# Patient Record
Sex: MALE | Race: WHITE
[De-identification: names, ages, dates, MRNs, and addresses within clinical notes are randomized per-mention and may not be internally consistent; named-entity substitution may affect disease eponyms.]

---

## 2018-01-04 ENCOUNTER — HOSPITAL ENCOUNTER (OUTPATIENT)
Dept: HOSPITAL 33 - SDC | Age: 68
Discharge: HOME | End: 2018-01-04
Attending: SURGERY
Payer: MEDICARE

## 2018-01-04 VITALS — DIASTOLIC BLOOD PRESSURE: 72 MMHG | HEART RATE: 65 BPM | SYSTOLIC BLOOD PRESSURE: 121 MMHG

## 2018-01-04 VITALS — OXYGEN SATURATION: 95 %

## 2018-01-04 DIAGNOSIS — C43.59: Primary | ICD-10-CM

## 2018-01-04 DIAGNOSIS — L98.9: ICD-10-CM

## 2018-01-04 PROCEDURE — 88305 TISSUE EXAM BY PATHOLOGIST: CPT

## 2018-01-04 PROCEDURE — 0HB6XZZ EXCISION OF BACK SKIN, EXTERNAL APPROACH: ICD-10-PCS | Performed by: SURGERY

## 2018-01-04 NOTE — OP
SURGERY DATE/TIME:    01/04/2018  1150



PREOPERATIVE DIAGNOSIS:     A 2.5 cm nonhealing lesion of back possibly cancer.



POSTOPERATIVE DIAGNOSIS:  A 2.5 cm nonhealing lesion of back possibly cancer.



PROCEDURE:    Excision of 2.5 cm lesion through a 5 x 3 cm elliptical excision with 
closure. 



SURGEON:        Max Tsai M.D.



ASSISTANT:         Medical Student III.



ANESTHESIA:    IVS sedation local.



COMPLICATIONS:    None.



CONDITION:        Stable.



INDICATION: A patient requiring excision.     



DESCRIPTION OF PROCEDURE:  Taken to the operating room. Routine prep and drape. IV 
sedation local anesthetic. Elliptical excision 2.5 cm through a 5 cm transverse incision, 
3 cm cephalad caudad down through the skin subcu, deep fascia and deep tissue in order to 
perform closure. Closed with simple interrupted sutures and vertical mattress sutures #2-0 
Prolene. Sterile dressing applied. The patient tolerated the procedure satisfactorily.

## 2018-02-12 NOTE — HP
DATE OF SURGERY:  02/15/2018



ADMISSION DIAGNOSIS:   Deep melanoma of the back. 



ANTICIPATED PROCEDURE: Wide excision with split thickness skin graft and [place] lymph 
node biopsy. 



PAST MEDICAL HISTORY: 

ALLERGIES: NONE.

 

PAST SURGICAL HISTORY: Forehead scalp cancer. Two surgeries on the left foot. 

 

SOCIAL HISTORY: Negative. 

FAMILY HISTORY: Negative.



REVIEW OF SYSTEMS: Negative.



PHYSICAL EXAMINATION:  

VITAL SIGNS: Normal.

CHEST:  Clear.

COR: Regular.

 

IMPRESSION:  The patient has deep melanoma of the back and is requiring wide excision 
[place] lymph node sampling.

## 2018-02-15 ENCOUNTER — HOSPITAL ENCOUNTER (OUTPATIENT)
Dept: HOSPITAL 33 - SDC | Age: 68
Discharge: HOME | End: 2018-02-15
Attending: SURGERY
Payer: MEDICARE

## 2018-02-15 VITALS — HEART RATE: 65 BPM | SYSTOLIC BLOOD PRESSURE: 126 MMHG | DIASTOLIC BLOOD PRESSURE: 75 MMHG

## 2018-02-15 VITALS — OXYGEN SATURATION: 94 %

## 2018-02-15 DIAGNOSIS — C43.59: Primary | ICD-10-CM

## 2018-02-15 DIAGNOSIS — Z85.828: ICD-10-CM

## 2018-02-15 PROCEDURE — 0HB5XZZ EXCISION OF CHEST SKIN, EXTERNAL APPROACH: ICD-10-PCS | Performed by: SURGERY

## 2018-02-15 PROCEDURE — 88341 IMHCHEM/IMCYTCHM EA ADD ANTB: CPT

## 2018-02-15 PROCEDURE — 0HR6X73 REPLACEMENT OF BACK SKIN WITH AUTOLOGOUS TISSUE SUBSTITUTE, FULL THICKNESS, EXTERNAL APPROACH: ICD-10-PCS | Performed by: SURGERY

## 2018-02-15 PROCEDURE — 00400 ANES INTEGUMENTARY SYS NOS: CPT

## 2018-02-15 PROCEDURE — 88342 IMHCHEM/IMCYTCHM 1ST ANTB: CPT

## 2018-02-15 PROCEDURE — 78195 LYMPH SYSTEM IMAGING: CPT

## 2018-02-15 PROCEDURE — 00300 ANES ALL PX INTEG H/N/PTRUNK: CPT

## 2018-02-15 NOTE — XRAY
Indication: Upper back melanoma.



4 subcutaneous injections of technetium 99 sulfur colloid totaling 250 Ci was

performed around the biopsy proven melanoma in the upper back.  Delayed

imaging was performed out to 90 minutes.  A single focus of increased

radiopharmaceutical activity seen in the left axilla which was demarcated for

the surgeon.



Impression: Technically successful nuclear medicine sentinel node injection

centered around melanoma.  Single sentinel node identified in the left axilla.

## 2018-02-19 NOTE — OP
SURGERY DATE/TIME:    02/15/2018  1525  



PREOPERATIVE DIAGNOSIS:    Thick melanoma 4 mm of the upper back. 



POSTOPERATIVE DIAGNOSIS:  Thick melanoma 4 mm of the upper back. 



PROCEDURES:    

1) Left axillary [place] lymph node sampling. 

2) Wide excision 12 to 14 cm circular of the excision of the lesion all the way down to 
and including fascia of the back. 

3) [place] of 144 sq/cm split thickness skin graft from the left lower chest wall. 

4) Application of 144 sq/cm of split thickness skin graft. 



SURGEON:        Max Tsai M.D.



ANESTHESIA:    General.



COMPLICATIONS:    None.



CONDITION:        Stable.



INDICATION:  A patient with thick melanoma of the back.    



DESCRIPTION OF PROCEDURE: Taken to surgery. General anesthetic. Positioning. The axilla 
was addressed first. About four nodes were taken. One of these had increased activity 
consistent with [place] node and it was labeled as such. Hemostasis satisfactory. Closed 
with 3-0 and 4-0 Vicryl.  A wide circular excision greater total hip arthroplasties 4 cm 
margin around the entire previous old incision. I think it is the older incision site. 
There was nearly a 2 cm elliptical margin of the lesion this was taken down in a slightly 
beveled fashion to the fascia. Hemostasis obtained with electrocautery. Split thickness 
skin graft was then harvested from the left lateral chest wall. It was then meshed a 1.5 
to 1. It was then applied with staples. Sterile ointment and sterile dressing applied. 
Sterile dressing at the other site. The patient tolerated the procedures satisfactorily.

## 2018-02-28 ENCOUNTER — HOSPITAL ENCOUNTER (EMERGENCY)
Dept: HOSPITAL 33 - ED | Age: 68
Discharge: ANOTHER HOSPITAL | End: 2018-02-28
Payer: MEDICARE

## 2018-02-28 VITALS — HEART RATE: 84 BPM | DIASTOLIC BLOOD PRESSURE: 85 MMHG | SYSTOLIC BLOOD PRESSURE: 108 MMHG

## 2018-02-28 VITALS — OXYGEN SATURATION: 94 %

## 2018-02-28 DIAGNOSIS — J35.1: Primary | ICD-10-CM

## 2018-02-28 DIAGNOSIS — L98.9: ICD-10-CM

## 2018-02-28 DIAGNOSIS — M54.2: ICD-10-CM

## 2018-02-28 DIAGNOSIS — B27.90: ICD-10-CM

## 2018-02-28 DIAGNOSIS — J02.0: ICD-10-CM

## 2018-02-28 DIAGNOSIS — Z85.828: ICD-10-CM

## 2018-02-28 LAB
ALBUMIN SERPL-MCNC: 3 G/DL (ref 3.4–5)
ALP SERPL-CCNC: 117 U/L (ref 46–116)
ALT SERPL-CCNC: 19 U/L (ref 12–78)
ANION GAP SERPL CALC-SCNC: 15.4 MEQ/L (ref 5–15)
AST SERPL QL: 19 U/L (ref 15–37)
BASOPHILS # BLD AUTO: 0.04 10*3/UL (ref 0–0.4)
BASOPHILS NFR BLD AUTO: 0.3 % (ref 0–0.4)
BILIRUB BLD-MCNC: 0.9 MG/DL (ref 0.2–1)
BLD SMEAR INTERP: YES
BUN SERPL-MCNC: 11 MG/DL (ref 9–20)
CALCIUM SPEC-MCNC: 8.9 MG/DL (ref 8.5–10.1)
CHLORIDE SERPL-SCNC: 99 MEQ/L (ref 98–107)
CO2 SERPL-SCNC: 26.3 MEQ/L (ref 21–32)
CREAT SERPL-MCNC: 1.1 MG/DL (ref 0.55–1.3)
EOSINOPHIL # BLD AUTO: 0.05 10*3/UL (ref 0–0.5)
FLUAV AG NPH QL IA: NEGATIVE
FLUBV AG NPH QL IA: NEGATIVE
GLUCOSE SERPL-MCNC: 125 MG/DL (ref 70–110)
GRANULOCYTES # BLD AUTO: 12.27 10*3/UL (ref 1.4–6.9)
HCT VFR BLD AUTO: 47.6 % (ref 42–50)
HGB BLD-MCNC: 16.2 GM/DL (ref 12.5–18)
LYMPHOCYTES # SPEC AUTO: 1.36 10*3/UL (ref 1–4.6)
MCH RBC QN AUTO: 30.2 PG (ref 26–32)
MCHC RBC AUTO-ENTMCNC: 34 G/DL (ref 32–36)
MONOCYTES # BLD AUTO: 1.55 10*3/UL (ref 0–1.3)
NEUTROPHILS NFR BLD AUTO: 80.3 % (ref 36–66)
PLATELET # BLD AUTO: 238 K/MM3 (ref 150–450)
POTASSIUM SERPLBLD-SCNC: 4.1 MEQ/L (ref 3.5–5.1)
PROT SERPL-MCNC: 9.3 GM/DL (ref 6.4–8.2)
RBC # BLD AUTO: 5.37 M/MM3 (ref 4.1–5.6)
RSV AG SPEC QL IA: NEGATIVE
SODIUM SERPL-SCNC: 137 MEQ/L (ref 136–145)
WBC # BLD AUTO: 15.3 K/MM3 (ref 4–10.5)

## 2018-02-28 PROCEDURE — 96360 HYDRATION IV INFUSION INIT: CPT

## 2018-02-28 PROCEDURE — 87631 RESP VIRUS 3-5 TARGETS: CPT

## 2018-02-28 PROCEDURE — 85025 COMPLETE CBC W/AUTO DIFF WBC: CPT

## 2018-02-28 PROCEDURE — 36415 COLL VENOUS BLD VENIPUNCTURE: CPT

## 2018-02-28 PROCEDURE — 70491 CT SOFT TISSUE NECK W/DYE: CPT

## 2018-02-28 PROCEDURE — 86308 HETEROPHILE ANTIBODY SCREEN: CPT

## 2018-02-28 PROCEDURE — 80053 COMPREHEN METABOLIC PANEL: CPT

## 2018-02-28 PROCEDURE — 99285 EMERGENCY DEPT VISIT HI MDM: CPT

## 2018-02-28 PROCEDURE — 87430 STREP A AG IA: CPT

## 2018-02-28 PROCEDURE — 96374 THER/PROPH/DIAG INJ IV PUSH: CPT

## 2018-02-28 PROCEDURE — 36000 PLACE NEEDLE IN VEIN: CPT

## 2018-02-28 PROCEDURE — 96365 THER/PROPH/DIAG IV INF INIT: CPT

## 2018-02-28 NOTE — XRAY
Indication: Bilateral pain/swelling under jaw.  History melanoma.



Multiple contiguous axial images obtained through the neck using 80 cc Isovue

370 contrast.  Sagittal and coronal reformatted images obtained.



Comparison: None



There are multiple bilateral dental amalgams producing beam artifact limiting

these levels.  Moderate/marked enlargement of the palatine tonsils, right

greater than left dramatically narrows the oropharynx.  Adenoids unremarkable.

 Remaining infraglottic airway widely patent.  Normal epiglottis.



There are scattered borderline prominent bilateral cervical and lesser degree

submandibular lymph nodes, largest level I on the right measuring 13 x 21 mm.

Smaller subcentimeter bilateral supraclavicular nodes.  Parotid and submental

glands are bilaterally symmetric.  Major arteries and veins are normal in

course and caliber.  Thyroid gland atrophic without focal mass/nodule.



Underlying cervical spine demonstrates moderate multilevel degenerative

changes greatest at the C6-C7 level.  Floor the right maxillary sinus

demonstrates mild mucosal thickening.  Base of the brain and lung apices are

unremarkable.  Posterior back demonstrate partially visualized posterior

changes with numerous surgical staples.



Impression:

1.  Moderate/marked enlarged palatine tonsils, right greater than left.  Rule

out tonsillitis.

2.  Several borderline prominent lymph nodes bilaterally presumed reactive.

Metastasis not completely excluded given history of melanoma.

3.  Incidental multilevel cervical degenerative changes and right maxillary

maxillary sinus disease.



CT DI 13.16

## 2018-02-28 NOTE — ERPHSYRPT
- History of Present Illness


Time Seen by Provider: 02/28/18 10:58


Source: patient, family


Exam Limitations: no limitations


Patient Subjective Stated Complaint: STATES HAS HAD SWELLING TO RIGHT SIDE OF 

NECK SINCE SUNDAY.  HX OF SKIN CANCER WITH LYMPH NODE INVOLVEMENT OF LEFT 

AXILLA.  HAD CANCER REMOVED FROM MID BACK WITH SKIN GRAFT DONE AND HAD LYMPH 

NODE FROM LEFT AXILLA REMOVED.


Triage Nursing Assessment: HAS OPEN SURGICAL WOUND TO MID BACK WITH METAL 

STAPLES AROUND IT.  CLEAN SURGICAL WOUND LEFT AXILLA.  MODERATE SWELLING AND 

TENDERNESS NOTED TO RIGHT ANTERIOR NECK.


Physician History: 





The patient is a 67-year-old male with family complaining of a sudden onset of 

right neck pain and swelling just below his jaw that began on Sunday.  He has 

not been able to eat solid food since the swelling.  He has been able to drink 

liquids.  He is not able to speak clearly.  His voice is muffled.  He denies 

vomiting.  His past medical history is significant for basal cell skin cancer, 

recent diagnosis of melanoma with lymph node involvement, and lower extremity 

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  Within the past month he had 

surgery in his mid upper back to remove a large section of tissue that had 

melanoma.  It had spread into the lymph node under his left axilla.  The lymph 

nodes were also surgically excised.


Timing/Duration: abrupt onset


Severity: severe


ENT Location: throat


Prearrival Treatment: no prearrival treatment


Modifying Factors: Improves With: nothing


Associated Symptoms: drooling, poor solids intake, swollen glands, sore throat


Allergies/Adverse Reactions: 








No Known Drug Allergies Allergy (Verified 02/28/18 10:10)


 





Home Medications: 








No Reportable Medications [No Reported Medications]  02/28/18 [History]





Hx Tetanus, Diphtheria Vaccination/Date Given: No


Hx Influenza Vaccination/Date Given: No


Hx Pneumococcal Vaccination/Date Given: No





- Review of Systems


Constitutional: No Fever, No Chills


Eyes: No Symptoms


Ears, Nose, & Throat: Throat Pain, Throat Swelling, Hoarse, Painful Swallowing


Respiratory: No Cough, No Dyspnea


Cardiac: No Chest Pain, No Edema, No Syncope


Abdominal/Gastrointestinal: No Abdominal Pain, No Nausea, No Vomiting, No 

Diarrhea


Genitourinary Symptoms: No Dysuria


Musculoskeletal: No Back Pain, No Neck Pain


Skin: No Rash


Neurological: No Dizziness, No Focal Weakness, No Sensory Changes


Psychological: No Symptoms


Endocrine: No Symptoms


Hematologic/Lymphatic: No Symptoms


Immunological/Allergic: No Symptoms





- Past Medical History


Pertinent Past Medical History: Yes


Neurological History: TIA


ENT History: No Pertinent History


Cardiac History: No Pertinent History


Respiratory History: No Pertinent History


Endocrine Medical History: No Pertinent History


Musculoskeletal History: Arthritis


GI Medical History: Hernia


 History: No Pertinent History


Psycho-Social History: No Pertinent History


Male Reproductive Disorders: No Pertinent History


Other Medical History: FOOT PROBLEMS HAS HAD INFUSION OF ANTIBIOTICS X 3 weeks 

2 years ago





- Past Surgical History


Past Surgical History: Yes


Neuro Surgical History: No Pertinent History


Cardiac: No Pertinent History


Respiratory: No Pertinent History


Gastrointestinal: No Pertinent History


Genitourinary: No Pertinent History


Musculoskeletal: Orthopedic Surgery


Male Surgical History: No Pertinent History


Other Surgical History: SKIN CANCER REMOVAL left foot x 2, and scalp, and 

forehead.





- Social History


Smoking Status: Never smoker


Exposure to second hand smoke: No


Drug Use: none


Patient Lives Alone: No





- Nursing Vital Signs


Nursing Vital Signs: 


 Initial Vital Signs











Temperature  98.0 F   02/28/18 10:04


 


Pulse Rate  108 H  02/28/18 10:04


 


Respiratory Rate  18   02/28/18 10:04


 


Blood Pressure  131/88   02/28/18 10:04


 


O2 Sat by Pulse Oximetry  94 L  02/28/18 10:04








 Pain Scale











Pain Intensity                 3

















- Physical Exam


General Appearance: moderate distress


Eye Exam: bilateral eye: normal inspection


Ear Exam: bilateral ear: auricle normal


Nasal Exam: normal inspection


Throat Exam: excessive drooling, pharynx swelling, tonsillar exudate, tonsillar 

swelling, voice changes


Neck Exam: lymphadenopathy (R)


Cardiovascular/Respiratory Exam: normal breath sounds, regular rate/rhythm


Abdominal Exam: non-tender, soft


Neurologic Exam: alert, oriented x 3, sensation nml, No motor deficits


Skin Exam: normal color, warm, dry, other (Examination of left foot shows 

multiple skin lesions in various degrees of healing.  2 skin lesions on the 

sole of his forefoot have granulomatous material.  There is another lesion over 

the approximate left MTP joint.  There is an open wound between the first 2 

toes.  The second toe has been surgically shortened.)


**SpO2 Interpretation**: normal


SpO2: 94


Oxygen Delivery: Room Air





- CT Exams


  ** Soft Tissue Neck


CT Interpretation: Tele-radiologist Report, Other (Marked enlarged palatine 

tonsils with dramatic narrowing of oropharynx per Dr Pradhan.)


Ordered Tests: 


 Active Orders 24 hr











 Category Date Time Status


 


 IV Insertion STAT Care  02/28/18 10:58 Active


 


 NECK WITH CONTRAST [CT] Stat Exams  02/28/18 10:59 Completed


 


 CBC W DIFF Stat Lab  02/28/18 11:27 Completed


 


 CMP Stat Lab  02/28/18 11:27 Completed


 


 Mono Screen Stat Lab  02/28/18 11:27 Completed


 


 STREP SCREEN-BETA A Stat Lab  02/28/18 11:27 Completed








Medication Summary











Generic Name Dose Route Start Last Admin





  Trade Name Freq  PRN Reason Stop Dose Admin


 


Ceftriaxone Sodium/Dextrose  1 g in 50 mls @ 100 mls/hr  02/28/18 13:29  02/28/ 18 13:34





  Rocephin 1 Gm-D5w 50 Ml Bag**  IV  02/28/18 13:58  100 mls/hr





  STAT STA   Administration














Discontinued Medications














Generic Name Dose Route Start Last Admin





  Trade Name Freq  PRN Reason Stop Dose Admin


 


Dexamethasone Sodium Phosphate  10 mg  02/28/18 13:28  02/28/18 13:34





  Decadron 10mg Inj.  IV  02/28/18 13:29  10 mg





  STAT ONE   Administration


 


Dexamethasone Sodium Phosphate  Confirm  02/28/18 13:32  





  Decadron 10mg Inj.  Administered  02/28/18 13:33  





  Dose   





  10 mg   





  .ROUTE   





  .STK-MED ONE   


 


Sodium Chloride  1,000 mls @ 999 mls/hr  02/28/18 10:58  02/28/18 11:28





  Sodium Chloride 0.9% 1000 Ml  IV  02/28/18 11:58  999 mls/hr





  .Q1H1M STA   Administration


 


Sodium Chloride  Confirm  02/28/18 11:26  





  Sodium Chloride 0.9% 1000 Ml  Administered  02/28/18 11:27  





  Dose   





  1,000 mls @ ud   





  .ROUTE   





  .STK-MED ONE   


 


Ceftriaxone Sodium/Dextrose  Confirm  02/28/18 13:33  





  Rocephin 1 Gm-D5w 50 Ml Bag**  Administered  02/28/18 13:34  





  Dose   





  1 g in 50 mls @ ud   





  IV   





  .STK-MED ONE   











Lab/Rad Data: 


 Laboratory Result Diagrams





 02/28/18 11:27 





 02/28/18 11:27 





 Laboratory Results











  02/28/18 02/28/18 02/28/18 Range/Units





  11:27 11:27 11:27 


 


WBC     (4.0-10.5)  K/mm3


 


RBC     (4.1-5.6)  M/mm3


 


Hgb     (12.5-18.0)  gm/dl


 


Hct     (42-50)  %


 


MCV     ()  fl


 


MCH     (26-32)  pg


 


MCHC     (32-36)  g/dl


 


RDW     (11.5-14.0)  %


 


Plt Count     (150-450)  K/mm3


 


MPV     (6-9.5)  fl


 


Gran %     (36.0-66.0)  %


 


Lymphocytes %     (24.0-44.0)  %


 


Monocytes %     (0.0-12.0)  %


 


Eosinophils %     (0.00-5.0)  %


 


Basophils %     (0.0-0.4)  %


 


Basophils #     (0-0.4)  


 


Sodium     (136-145)  mEq/L


 


Potassium     (3.5-5.1)  mEq/L


 


Chloride     ()  mEq/L


 


Carbon Dioxide     (21-32)  mEq/L


 


Anion Gap     (5-15)  MEQ/L


 


BUN     (9-20)  mg/dL


 


Creatinine     (0.55-1.30)  mg/dl


 


Estimated GFR     ML/MIN


 


Glucose     ()  MG/DL


 


Calcium     (8.5-10.1)  mg/dL


 


Total Bilirubin     (0.2-1.0)  mg/dL


 


AST     (15-37)  U/L


 


ALT     (12-78)  U/L


 


Alkaline Phosphatase     ()  U/L


 


Serum Total Protein     (6.4-8.2)  gm/dL


 


Albumin     (3.4-5.0)  g/dL


 


Monoscreen    POSITIVE  (Negative)  


 


Influenza Type A Ag  NEGATIVE    (NEGATIVE)  


 


Influenza Type B Ag  NEGATIVE    (NEGATIVE)  


 


RSV (PCR)  NEGATIVE    (Negative)  


 


Streptococcus Screen   POSITIVE   (Negative)  


 


Slides for Path Review     














  02/28/18 02/28/18 Range/Units





  11:27 11:27 


 


WBC   15.3 H  (4.0-10.5)  K/mm3


 


RBC   5.37  (4.1-5.6)  M/mm3


 


Hgb   16.2  (12.5-18.0)  gm/dl


 


Hct   47.6  (42-50)  %


 


MCV   88.6  ()  fl


 


MCH   30.2  (26-32)  pg


 


MCHC   34.0  (32-36)  g/dl


 


RDW   13.8  (11.5-14.0)  %


 


Plt Count   238  (150-450)  K/mm3


 


MPV   9.8 H  (6-9.5)  fl


 


Gran %   80.3 H  (36.0-66.0)  %


 


Lymphocytes %   8.9 L  (24.0-44.0)  %


 


Monocytes %   10.2  (0.0-12.0)  %


 


Eosinophils %   0.3  (0.00-5.0)  %


 


Basophils %   0.3  (0.0-0.4)  %


 


Basophils #   0.04  (0-0.4)  


 


Sodium  137   (136-145)  mEq/L


 


Potassium  4.1   (3.5-5.1)  mEq/L


 


Chloride  99   ()  mEq/L


 


Carbon Dioxide  26.3   (21-32)  mEq/L


 


Anion Gap  15.4 H   (5-15)  MEQ/L


 


BUN  11   (9-20)  mg/dL


 


Creatinine  1.10   (0.55-1.30)  mg/dl


 


Estimated GFR  > 60   ML/MIN


 


Glucose  125 H   ()  MG/DL


 


Calcium  8.9   (8.5-10.1)  mg/dL


 


Total Bilirubin  0.90   (0.2-1.0)  mg/dL


 


AST  19   (15-37)  U/L


 


ALT  19   (12-78)  U/L


 


Alkaline Phosphatase  117 H   ()  U/L


 


Serum Total Protein  9.3 H   (6.4-8.2)  gm/dL


 


Albumin  3.0 L   (3.4-5.0)  g/dL


 


Monoscreen    (Negative)  


 


Influenza Type A Ag    (NEGATIVE)  


 


Influenza Type B Ag    (NEGATIVE)  


 


RSV (PCR)    (Negative)  


 


Streptococcus Screen    (Negative)  


 


Slides for Path Review   YES  














- Progress


Progress: unchanged


Progress Note: 





02/28/18 13:43


I first called [facility] and spoke with Dr. Kingsley who was unable to 

accept the patient.  I then called [facility] but  have not been able to 

speak with the hospitalist.  I spoke with Dr. Carmenza Bonilla of ENT in [place] 

who would see the patient as a consult at [facility].  Dr. Burk, hospitalist, a [facility] accepts patient.


Counseled pt/family regarding: lab results, diagnosis, rad results





- Departure


Time of Disposition: 13:40


Departure Disposition: Transfer (Transfer to [facility] 

per Dr Burk, hospitalist.)


Clinical Impression: 


 Tonsillar enlargement, Strep pharyngitis, Mononucleosis





Condition: Stable


Critical Care Time: No


Referrals: 


DOCTOR,NO FAMILY [Primary Care Provider] -

## 2018-04-09 ENCOUNTER — HOSPITAL ENCOUNTER (INPATIENT)
Dept: HOSPITAL 33 - ED | Age: 68
LOS: 9 days | Discharge: HOME | DRG: 579 | End: 2018-04-18
Attending: FAMILY MEDICINE | Admitting: FAMILY MEDICINE
Payer: MEDICARE

## 2018-04-09 DIAGNOSIS — R32: ICD-10-CM

## 2018-04-09 DIAGNOSIS — M86.9: ICD-10-CM

## 2018-04-09 DIAGNOSIS — Z86.73: ICD-10-CM

## 2018-04-09 DIAGNOSIS — M79.89: ICD-10-CM

## 2018-04-09 DIAGNOSIS — M19.90: ICD-10-CM

## 2018-04-09 DIAGNOSIS — L89.899: ICD-10-CM

## 2018-04-09 DIAGNOSIS — A41.02: ICD-10-CM

## 2018-04-09 DIAGNOSIS — L03.116: Primary | ICD-10-CM

## 2018-04-09 DIAGNOSIS — R78.81: ICD-10-CM

## 2018-04-09 DIAGNOSIS — Z85.820: ICD-10-CM

## 2018-04-09 LAB
ALBUMIN SERPL-MCNC: 3.9 G/DL (ref 3.5–5)
ALP SERPL-CCNC: 104 U/L (ref 38–126)
ALT SERPL-CCNC: 18 U/L (ref 0–50)
AMYLASE SERPL-CCNC: 117 U/L (ref 30–110)
ANION GAP SERPL CALC-SCNC: 14.2 MEQ/L (ref 5–15)
APTT PPP: 31.6 SECONDS (ref 24.1–36.1)
AST SERPL QL: 26 U/L (ref 17–59)
BILIRUB BLD-MCNC: 1.5 MG/DL (ref 0.2–1.3)
BUN SERPL-MCNC: 22 MG/DL (ref 9–20)
CALCIUM SPEC-MCNC: 8.7 MG/DL (ref 8.4–10.2)
CELLS COUNTED: 100
CHLORIDE SERPL-SCNC: 98 MMOL/L (ref 98–107)
CO2 SERPL-SCNC: 26 MMOL/L (ref 22–30)
CREAT SERPL-MCNC: 1.26 MG/DL (ref 0.66–1.25)
GLUCOSE SERPL-MCNC: 139 MG/DL (ref 74–106)
GLUCOSE UR-MCNC: NEGATIVE MG/DL
GRANULOCYTES # BLD AUTO: 18.93 10*3/UL (ref 1.4–6.9)
HCT VFR BLD AUTO: 40.4 % (ref 42–50)
HGB BLD-MCNC: 14 GM/DL (ref 12.5–18)
INR PPP: 1.63 (ref 0.8–3)
LIPASE SERPL-CCNC: 321 U/L (ref 23–300)
MANUAL DIF COMMENT BLD-IMP: NORMAL
MCH RBC QN AUTO: 30 PG (ref 26–32)
MCHC RBC AUTO-ENTMCNC: 34.7 G/DL (ref 32–36)
NEUTS BAND # BLD MANUAL: 4 % (ref 0–2)
PLATELET # BLD AUTO: 215 K/MM3 (ref 150–450)
POTASSIUM SERPLBLD-SCNC: 4.1 MMOL/L (ref 3.5–5.1)
PROT SERPL-MCNC: 8.2 G/DL (ref 6.3–8.2)
PROT UR STRIP-MCNC: 100 MG/DL
RBC # BLD AUTO: 4.66 M/MM3 (ref 4.1–5.6)
SODIUM SERPL-SCNC: 134 MMOL/L (ref 137–145)
WBC # BLD AUTO: 21.2 K/MM3 (ref 4–10.5)

## 2018-04-09 PROCEDURE — 36569 INSJ PICC 5 YR+ W/O IMAGING: CPT

## 2018-04-09 PROCEDURE — 87086 URINE CULTURE/COLONY COUNT: CPT

## 2018-04-09 PROCEDURE — 85730 THROMBOPLASTIN TIME PARTIAL: CPT

## 2018-04-09 PROCEDURE — 85025 COMPLETE CBC W/AUTO DIFF WBC: CPT

## 2018-04-09 PROCEDURE — 80053 COMPREHEN METABOLIC PANEL: CPT

## 2018-04-09 PROCEDURE — 83735 ASSAY OF MAGNESIUM: CPT

## 2018-04-09 PROCEDURE — 76942 ECHO GUIDE FOR BIOPSY: CPT

## 2018-04-09 PROCEDURE — 83690 ASSAY OF LIPASE: CPT

## 2018-04-09 PROCEDURE — 36415 COLL VENOUS BLD VENIPUNCTURE: CPT

## 2018-04-09 PROCEDURE — 28810 AMPUTATION TOE & METATARSAL: CPT

## 2018-04-09 PROCEDURE — 10060 I&D ABSCESS SIMPLE/SINGLE: CPT

## 2018-04-09 PROCEDURE — 81000 URINALYSIS NONAUTO W/SCOPE: CPT

## 2018-04-09 PROCEDURE — 97606 NEG PRS WND THER DME>50 SQCM: CPT

## 2018-04-09 PROCEDURE — 85652 RBC SED RATE AUTOMATED: CPT

## 2018-04-09 PROCEDURE — 0H9NXZX DRAINAGE OF LEFT FOOT SKIN, EXTERNAL APPROACH, DIAGNOSTIC: ICD-10-PCS | Performed by: SURGERY

## 2018-04-09 PROCEDURE — 86140 C-REACTIVE PROTEIN: CPT

## 2018-04-09 PROCEDURE — 99285 EMERGENCY DEPT VISIT HI MDM: CPT

## 2018-04-09 PROCEDURE — 96365 THER/PROPH/DIAG IV INF INIT: CPT

## 2018-04-09 PROCEDURE — 87070 CULTURE OTHR SPECIMN AEROBIC: CPT

## 2018-04-09 PROCEDURE — 73630 X-RAY EXAM OF FOOT: CPT

## 2018-04-09 PROCEDURE — G0378 HOSPITAL OBSERVATION PER HR: HCPCS

## 2018-04-09 PROCEDURE — 0Y6Q0Z0 DETACHMENT AT LEFT 1ST TOE, COMPLETE, OPEN APPROACH: ICD-10-PCS | Performed by: SURGERY

## 2018-04-09 PROCEDURE — 88305 TISSUE EXAM BY PATHOLOGIST: CPT

## 2018-04-09 PROCEDURE — 82962 GLUCOSE BLOOD TEST: CPT

## 2018-04-09 PROCEDURE — 87040 BLOOD CULTURE FOR BACTERIA: CPT

## 2018-04-09 PROCEDURE — 93926 LOWER EXTREMITY STUDY: CPT

## 2018-04-09 PROCEDURE — 83605 ASSAY OF LACTIC ACID: CPT

## 2018-04-09 PROCEDURE — 80048 BASIC METABOLIC PNL TOTAL CA: CPT

## 2018-04-09 PROCEDURE — 96360 HYDRATION IV INFUSION INIT: CPT

## 2018-04-09 PROCEDURE — 87186 SC STD MICRODIL/AGAR DIL: CPT

## 2018-04-09 PROCEDURE — 96372 THER/PROPH/DIAG INJ SC/IM: CPT

## 2018-04-09 PROCEDURE — 99284 EMERGENCY DEPT VISIT MOD MDM: CPT

## 2018-04-09 PROCEDURE — 85610 PROTHROMBIN TIME: CPT

## 2018-04-09 PROCEDURE — 36000 PLACE NEEDLE IN VEIN: CPT

## 2018-04-09 PROCEDURE — 97598 DBRDMT OPN WND ADDL 20CM/<: CPT

## 2018-04-09 PROCEDURE — 87077 CULTURE AEROBIC IDENTIFY: CPT

## 2018-04-09 PROCEDURE — 97530 THERAPEUTIC ACTIVITIES: CPT

## 2018-04-09 PROCEDURE — 97161 PT EVAL LOW COMPLEX 20 MIN: CPT

## 2018-04-09 PROCEDURE — 82150 ASSAY OF AMYLASE: CPT

## 2018-04-09 PROCEDURE — 77001 FLUOROGUIDE FOR VEIN DEVICE: CPT

## 2018-04-09 PROCEDURE — 80202 ASSAY OF VANCOMYCIN: CPT

## 2018-04-09 PROCEDURE — 88311 DECALCIFY TISSUE: CPT

## 2018-04-09 NOTE — ERPHSYRPT
- History of Present Illness


Time Seen by Provider: 04/09/18 20:09


Source: patient, family (FIANCE & DAUGHTER)


Exam Limitations: no limitations


Physician History: 





FOR THE PAST 4 DAYS PT HAS HAD REDNESS OF THE LEFT FOOT; FOR THE PAST 2 DAYS 

INCONTINENCE OF URINE AND BLEEDING FROM THE ULCER ON THE DORSUM OF HIS LEFT 

FOREFOOT; TODAY DIAPHORESIS AND CHILLS. PT HAS HAD LEFT FOOT SWELLING WITH ULCER

(S) FOR THE PAST 7 YEARS.  


Allergies/Adverse Reactions: 








No Known Drug Allergies Allergy (Verified 02/28/18 10:10)


 





Home Medications: 








No Reportable Medications [No Reported Medications]  02/28/18 [History]





Hx Tetanus, Diphtheria Vaccination/Date Given: No


Hx Influenza Vaccination/Date Given: No


Hx Pneumococcal Vaccination/Date Given: No





- Review of Systems


Constitutional: Chills


Respiratory: No Dyspnea


Cardiac: No Chest Pain


Abdominal/Gastrointestinal: No Abdominal Pain, No Vomiting


Genitourinary Symptoms: Incontinence


Musculoskeletal: Other (LEFT FOOT ERYTHEMA/SWELLING)


Skin: Decubiti (LEFT FOOT), Other (HX MELANOMA OF UPPER BACK)


Endocrine: Excessive Sweating


All Other Systems: Reviewed and Negative





- Past Medical History


Pertinent Past Medical History: Yes


Neurological History: TIA


ENT History: No Pertinent History


Cardiac History: No Pertinent History


Respiratory History: No Pertinent History


Endocrine Medical History: No Pertinent History


Musculoskeletal History: Arthritis


GI Medical History: Hernia


 History: No Pertinent History


Psycho-Social History: No Pertinent History


Male Reproductive Disorders: No Pertinent History


Other Medical History: FOOT PROBLEMS HAS HAD INFUSION OF ANTIBIOTICS X 3 weeks 

2 years ago





- Past Surgical History


Past Surgical History: Yes


Neuro Surgical History: No Pertinent History


Cardiac: No Pertinent History


Respiratory: No Pertinent History


Gastrointestinal: No Pertinent History


Genitourinary: No Pertinent History


Musculoskeletal: Orthopedic Surgery


Male Surgical History: No Pertinent History


Other Surgical History: SKIN CANCER REMOVAL left foot x 2, and scalp, and 

forehead.





- Social History


Smoking Status: Never smoker


Exposure to second hand smoke: No


Drug Use: none


Patient Lives Alone: No





- Nursing Vital Signs


Nursing Vital Signs: 


 Initial Vital Signs











Temperature  102.5 F   04/09/18 20:36


 


Pulse Rate  104 H  04/09/18 20:36


 


Respiratory Rate  22   04/09/18 20:36


 


Blood Pressure  105/59   04/09/18 20:36


 


O2 Sat by Pulse Oximetry  94 L  04/09/18 20:36








 Pain Scale











Pain Intensity                 3

















- Physical Exam


General Appearance: alert


Eye Exam: PERRL/EOMI


Ears, Nose, Throat Exam: pharynx normal, moist mucous membranes


Neck Exam: normal inspection


Respiratory Exam: lungs clear


Cardiovascular Exam: normal heart sounds


Gastrointestinal/Abdomen Exam: soft, normal bowel sounds


Back Exam: normal range of motion, other (UPPER MID BACK HAS A SURGICAL SCAR ~ 

10 CM X 8 CM(FROM REMOVAL OF MELANOMA))


Extremity Exam: pedal edema (+3 EDEMA OF LEFT FOOT WITH ERYTHEMA AND ~ 1.5 CM X 

1 CM DECUBITUS ULCER OOZING BLOOD ON DORSUM OF LEFT FOREFOOT.)


Neurologic Exam: alert, cooperative


Skin Exam: decubitus ((2) ~ 1 CM DIAMETER DECUBITUS ULCERS ON PLANTAR ASPECT OF 

LEFT FOREFOOT.)





- Course


Nursing assessment & vital signs reviewed: Yes


Ordered Tests: 


 Active Orders 24 hr











 Category Date Time Status


 


 IV Insertion STAT Care  04/09/18 20:25 Active


 


 Wound Care STAT Care  04/09/18 20:41 Active


 


 AMYLASE Stat Lab  04/09/18 20:50 Completed


 


 BLOOD CULTURE Stat Lab  04/09/18 21:00 Received


 


 CBC W DIFF Stat Lab  04/09/18 20:50 Completed


 


 CMP Stat Lab  04/09/18 20:50 Completed


 


 CULTURE,URINE Stat Lab  04/09/18 21:20 Received


 


 CULTURE,WOUND Stat Lab  04/09/18 Uncollected


 


 LIPASE Stat Lab  04/09/18 20:50 Completed


 


 Lactic Acid Stat Lab  04/09/18 21:03 Results


 


 MAGNESIUM Stat Lab  04/09/18 20:50 Completed


 


 Manual Differential NC Stat Lab  04/09/18 20:50 Completed


 


 PROTIME WITH INR Stat Lab  04/09/18 20:50 Completed


 


 PTT Stat Lab  04/09/18 20:50 Completed


 


 UA W/ MICROSCOPIC Stat Lab  04/09/18 21:20 Completed








Medication Summary














Discontinued Medications














Generic Name Dose Route Start Last Admin





  Trade Name Freq  PRN Reason Stop Dose Admin


 


Acetaminophen  975 mg  04/09/18 20:40  04/09/18 21:22





  Tylenol 325 Mg***  PO  04/09/18 20:41  975 mg





  STAT ONE   Administration


 


Acetaminophen  Confirm  04/09/18 21:20  





  Tylenol 325 Mg***  Administered  04/09/18 21:21  





  Dose   





  975 mg   





  .ROUTE   





  .STK-MED ONE   


 


Clindamycin HCl/Dextrose  900 mg in 50 mls @ 100 mls/hr  04/09/18 20:27  04/09/ 18 21:23





  Clindamycin-D5w 900 Mg/50 Ml***  IV  04/09/18 20:56  100 mls/hr





  STAT STA   Administration


 


Sodium Chloride  1,000 mls @ 999 mls/hr  04/09/18 20:25  04/09/18 21:23





  Sodium Chloride 0.9% 1000 Ml  IV  04/09/18 21:25  999 mls/hr





  .Q1H1M STA   Administration


 


Clindamycin HCl/Dextrose  Confirm  04/09/18 21:20  





  Clindamycin-D5w 900 Mg/50 Ml***  Administered  04/09/18 21:21  





  Dose   





  900 mg in 50 mls @ ud   





  IV   





  .STK-MED ONE   


 


Sodium Chloride  Confirm  04/09/18 21:20  





  Sodium Chloride 0.9% 1000 Ml  Administered  04/09/18 21:21  





  Dose   





  1,000 mls @ ud   





  .ROUTE   





  .STK-MED ONE   











Lab/Rad Data: 


 Laboratory Result Diagrams





 04/09/18 20:50 





 04/09/18 20:50 





 Laboratory Results











  04/09/18 04/09/18 04/09/18 Range/Units





  21:20 21:03 20:50 


 


WBC     (4.0-10.5)  K/mm3


 


RBC     (4.1-5.6)  M/mm3


 


Hgb     (12.5-18.0)  gm/dl


 


Hct     (42-50)  %


 


MCV     ()  fl


 


MCH     (26-32)  pg


 


MCHC     (32-36)  g/dl


 


RDW     (11.5-14.0)  %


 


Plt Count     (150-450)  K/mm3


 


MPV     (6-9.5)  fl


 


Absolute Granulocytes     (1.4-6.9)  


 


PT    18.2 H  (8.83-12.87)  SECONDS


 


INR    1.63  (0.8-3.0)  


 


APTT    31.6  (24.1-36.1)  SECONDS


 


Sodium     (137-145)  mmol/L


 


Potassium     (3.5-5.1)  mmol/L


 


Chloride     ()  mmol/L


 


Carbon Dioxide     (22-30)  mmol/L


 


Anion Gap     (5-15)  MEQ/L


 


BUN     (9-20)  mg/dL


 


Creatinine     (0.66-1.25)  mg/dL


 


Estimated GFR     ML/MIN


 


Glucose     ()  mg/dL


 


Lactic Acid   1.9   (0.4-2.0)  


 


Calcium     (8.4-10.2)  mg/dL


 


Magnesium     (1.6-2.3)  mg/dL


 


Total Bilirubin     (0.2-1.3)  mg/dL


 


AST     (17-59)  U/L


 


ALT     (0-50)  U/L


 


Alkaline Phosphatase     ()  U/L


 


Serum Total Protein     (6.3-8.2)  g/dL


 


Albumin     (3.5-5.0)  g/dL


 


Amylase     ()  U/L


 


Lipase     ()  U/L


 


Ur Collection Type  CLEAN CATCH    


 


Urine Color  JAI    (YELLOW)  


 


Urine Appearance  CLEAR    (CLEAR)  


 


Urine pH  5.0    (5-6)  


 


Ur Specific Gravity  1.020    (1.005-1.025)  


 


Urine Protein  100    (Negative)  


 


Urine Ketones  NEGATIVE    (NEGATIVE)  


 


Urine Blood  250    (0-5)  Sonu/ul


 


Urine Nitrite  NEGATIVE    (NEGATIVE)  


 


Urine Bilirubin  NEGATIVE    (NEGATIVE)  


 


Urine Urobilinogen  NORMAL    (0-1)  mg/dL


 


Ur Leukocyte Esterase  NEGATIVE    (NEGATIVE)  


 


Urine Microscopic RBC  10-15    (0-2)  /HPF


 


Urine Bacteria  FEW    (NEGATIVE)  /HPF


 


Urine Mucus  SLIGHT    (NEGATIVE)  /HPF


 


Urine Culture Reflexed  YES    (NO)  


 


Urine Glucose  NEGATIVE    (NEGATIVE)  mg/dL


 


Specimen Received  4-9-18 04/09/18 04/09/18 Range/Units





  20:50 20:50 


 


WBC   21.2 H  (4.0-10.5)  K/mm3


 


RBC   4.66  (4.1-5.6)  M/mm3


 


Hgb   14.0  (12.5-18.0)  gm/dl


 


Hct   40.4 L  (42-50)  %


 


MCV   86.7  ()  fl


 


MCH   30.0  (26-32)  pg


 


MCHC   34.7  (32-36)  g/dl


 


RDW   14.2 H  (11.5-14.0)  %


 


Plt Count   215  (150-450)  K/mm3


 


MPV   10.7 H  (6-9.5)  fl


 


Absolute Granulocytes   18.93 H  (1.4-6.9)  


 


PT    (8.83-12.87)  SECONDS


 


INR    (0.8-3.0)  


 


APTT    (24.1-36.1)  SECONDS


 


Sodium  134 L   (137-145)  mmol/L


 


Potassium  4.1   (3.5-5.1)  mmol/L


 


Chloride  98   ()  mmol/L


 


Carbon Dioxide  26   (22-30)  mmol/L


 


Anion Gap  14.2   (5-15)  MEQ/L


 


BUN  22 H   (9-20)  mg/dL


 


Creatinine  1.26 H   (0.66-1.25)  mg/dL


 


Estimated GFR  > 60.0   ML/MIN


 


Glucose  139 H   ()  mg/dL


 


Lactic Acid    (0.4-2.0)  


 


Calcium  8.7   (8.4-10.2)  mg/dL


 


Magnesium  1.8   (1.6-2.3)  mg/dL


 


Total Bilirubin  1.50 H   (0.2-1.3)  mg/dL


 


AST  26   (17-59)  U/L


 


ALT  18   (0-50)  U/L


 


Alkaline Phosphatase  104   ()  U/L


 


Serum Total Protein  8.2   (6.3-8.2)  g/dL


 


Albumin  3.9   (3.5-5.0)  g/dL


 


Amylase  117 H   ()  U/L


 


Lipase  321 H   ()  U/L


 


Ur Collection Type    


 


Urine Color    (YELLOW)  


 


Urine Appearance    (CLEAR)  


 


Urine pH    (5-6)  


 


Ur Specific Gravity    (1.005-1.025)  


 


Urine Protein    (Negative)  


 


Urine Ketones    (NEGATIVE)  


 


Urine Blood    (0-5)  Sonu/ul


 


Urine Nitrite    (NEGATIVE)  


 


Urine Bilirubin    (NEGATIVE)  


 


Urine Urobilinogen    (0-1)  mg/dL


 


Ur Leukocyte Esterase    (NEGATIVE)  


 


Urine Microscopic RBC    (0-2)  /HPF


 


Urine Bacteria    (NEGATIVE)  /HPF


 


Urine Mucus    (NEGATIVE)  /HPF


 


Urine Culture Reflexed    (NO)  


 


Urine Glucose    (NEGATIVE)  mg/dL


 


Specimen Received    














- Progress


Discussed with DrGrazyna: Justice (OBS - [id number])





- Departure


Time of Disposition: 21:56


Departure Disposition: Observation


Clinical Impression: 


 CELLULITIS OF LEFT FOOT, DECUBITUS ULCERS OF LEFT FOOT, ARTHRITIS





Condition: Stable


Critical Care Time: No


Referrals: 


DOCTOR,NO FAMILY [Primary Care Provider] -

## 2018-04-10 LAB
ALBUMIN SERPL-MCNC: 3.4 G/DL (ref 3.5–5)
ALP SERPL-CCNC: 99 U/L (ref 38–126)
ALT SERPL-CCNC: 14 U/L (ref 0–50)
AMYLASE SERPL-CCNC: 68 U/L (ref 30–110)
ANION GAP SERPL CALC-SCNC: 15.6 MEQ/L (ref 5–15)
AST SERPL QL: 19 U/L (ref 17–59)
BILIRUB BLD-MCNC: 1.3 MG/DL (ref 0.2–1.3)
BUN SERPL-MCNC: 25 MG/DL (ref 9–20)
CALCIUM SPEC-MCNC: 8.2 MG/DL (ref 8.4–10.2)
CELLS COUNTED: 100
CHLORIDE SERPL-SCNC: 99 MMOL/L (ref 98–107)
CO2 SERPL-SCNC: 22 MMOL/L (ref 22–30)
CREAT SERPL-MCNC: 1.25 MG/DL (ref 0.66–1.25)
GLUCOSE SERPL-MCNC: 123 MG/DL (ref 74–106)
GRANULOCYTES # BLD AUTO: 17.88 10*3/UL (ref 1.4–6.9)
HCT VFR BLD AUTO: 41.7 % (ref 42–50)
HGB BLD-MCNC: 14.2 GM/DL (ref 12.5–18)
LIPASE SERPL-CCNC: 45 U/L (ref 23–300)
MANUAL DIF COMMENT BLD-IMP: NORMAL
MCH RBC QN AUTO: 30.2 PG (ref 26–32)
MCHC RBC AUTO-ENTMCNC: 34.1 G/DL (ref 32–36)
NEUTS BAND # BLD MANUAL: 3 % (ref 0–2)
PLATELET # BLD AUTO: 156 K/MM3 (ref 150–450)
POTASSIUM SERPLBLD-SCNC: 3.8 MMOL/L (ref 3.5–5.1)
PROT SERPL-MCNC: 7 G/DL (ref 6.3–8.2)
RBC # BLD AUTO: 4.7 M/MM3 (ref 4.1–5.6)
SODIUM SERPL-SCNC: 133 MMOL/L (ref 137–145)
WBC # BLD AUTO: 20.7 K/MM3 (ref 4–10.5)

## 2018-04-10 RX ADMIN — ACETAMINOPHEN SCH MG: 325 TABLET ORAL at 09:11

## 2018-04-10 RX ADMIN — PIPERACILLIN SODIUM AND TAZOBACTAM SODIUM SCH MLS/HR: .375; 3 INJECTION, POWDER, LYOPHILIZED, FOR SOLUTION INTRAVENOUS at 18:19

## 2018-04-10 RX ADMIN — ACETAMINOPHEN SCH MG: 325 TABLET ORAL at 23:31

## 2018-04-10 RX ADMIN — CLINDAMYCIN IN 5 PERCENT DEXTROSE SCH MLS/HR: 12 INJECTION, SOLUTION INTRAVENOUS at 00:50

## 2018-04-10 RX ADMIN — CLINDAMYCIN IN 5 PERCENT DEXTROSE SCH MLS/HR: 12 INJECTION, SOLUTION INTRAVENOUS at 05:16

## 2018-04-10 RX ADMIN — ACETAMINOPHEN SCH MG: 325 TABLET ORAL at 15:19

## 2018-04-10 RX ADMIN — CLINDAMYCIN IN 5 PERCENT DEXTROSE SCH MLS/HR: 12 INJECTION, SOLUTION INTRAVENOUS at 21:23

## 2018-04-10 RX ADMIN — CLINDAMYCIN IN 5 PERCENT DEXTROSE SCH MLS/HR: 12 INJECTION, SOLUTION INTRAVENOUS at 11:19

## 2018-04-10 RX ADMIN — PIPERACILLIN SODIUM AND TAZOBACTAM SODIUM SCH MLS/HR: .375; 3 INJECTION, POWDER, LYOPHILIZED, FOR SOLUTION INTRAVENOUS at 23:31

## 2018-04-10 RX ADMIN — ACETAMINOPHEN SCH MG: 325 TABLET ORAL at 18:19

## 2018-04-10 RX ADMIN — PIPERACILLIN SODIUM AND TAZOBACTAM SODIUM SCH MLS/HR: .375; 3 INJECTION, POWDER, LYOPHILIZED, FOR SOLUTION INTRAVENOUS at 15:19

## 2018-04-10 NOTE — XRAY
Indication: Osteomyelitis.



Comparison: None



3 nonweightbearing views of the left foot demonstrates midfoot degenerative

changes, heel spurs, 1st MTP advanced degenerative changes, distal 2nd

metatarsal amputation, and 3rd metatarsal head deformity with heterotopic

ossifications presumed from old injury/surgery.  Diffuse forefoot soft tissue

swelling with subcutaneous emphysema presumed from gas-forming infection.  No

suspicious bony lesions or osseous destructive process.

## 2018-04-10 NOTE — XRAY
Indication: Nonhealing wound.



Two-dimensional sonogram and color Doppler imaging of the major arteries of

the left leg was performed.



Comparison: None



Visualized common femoral, superficial femoral popliteal, and dorsal pedal

arteries appear widely patent with multiphasic arterial waveforms.  Posterior

tibial artery demonstrates minimal arteriosclerotic disease without critical

stenosis/obstruction and demonstrates normal biphasic arterial waveforms.



Left arm brachial pressure is 92.  Left ankle pressure is 110.  Ankle brachial

index is 1.2, normal.



Impression: Minimal arteriosclerotic disease in the posterior tibial artery.

Remaining left leg arterial Doppler sonogram is negative for critical

stenosis/obstruction.  Normal ankle-brachial index 1.2.

## 2018-04-10 NOTE — PCM.HP
History of Present Illness





- Chief Complaint


Chief Complaint: DECUBITUS ULCERS ON LEFT FOOT


Date: 04/10/18


History of Present Illness: 


 is a 67 year old male.


who has had chronic problem with recurrent foot wounds with a previous 

osteomyelitis of the left foot in 2016 that he was treated at [facility] for IV 

antibiotics followed by partial amputations by Dr. Alvarado in [place]. He 

started having new sores open up 6 months ago and did not seek treatment for 

these. He says they were a little better when he was given antibiotics for his 

throat infection 2 months ago but last week the foot became much more painful 

and swollen and much worse over the last 3 days with drainage swelling redness 

and fever. He also developed nausea and decreased appetite. 





- Review of Systems


Constitutional: Fever, Chills, Fatigue


Eyes: No Discharge, No Vision Changes


Ears, Nose, & Throat: No Ear Pain, No Nose Pain, No Nose Discharge, No Sinus 

Drainage, No Throat Swelling


Respiratory: No Cough, No Orthopnea, No Short Of Breath


Cardiac: No Chest Pain, No Edema, No Palpitations


Abdominal/Gastrointestinal: Nausea, No Abdominal Pain, No Vomiting


Musculoskeletal: Deformity, Joint Redness


Skin: Cellulitis, Skin Lesions


Neurological: No Focal Weakness, No Headache, No Speech Changes


Psychological: No Symptoms


Endocrine: No Symptoms


Hematologic/Lymphatic: No Symptoms





Medications & Allergies


Home Medications: 


 Home Medication List





No Reportable Medications [No Reported Medications]  02/28/18 [History 

Confirmed 04/09/18]








Allergies/Adverse Reactions: 


 Allergies











Allergy/AdvReac Type Severity Reaction Status Date / Time


 


No Known Drug Allergies Allergy   Verified 02/28/18 10:10














- Past Medical History


Past Medical History: Yes


Neurological History: Stroke, TIA


ENT History: No Pertinent History


Cardiac History: No Pertinent History


Respiratory History: No Pertinent History


Endocrine Medical History: No Pertinent History


Musculoskelatal History: Arthritis


GI Medical History: Hernia


 History: No Pertinent History


Pyscho-Social History: No Pertinent History


Male Reproductive Disorders: No Pertinent History


Comment: FOOT PROBLEMS HAS HAD INFUSION OF ANTIBIOTICS X 3 weeks 2 years ago





- Past Surgical History


Past Surgical History: Yes


Neuro Surgical History: No Pertinent History


Cardiac History: No Pertinent History


Respiratory Surgery: No Pertinent History


GI Surgical History: No Pertinent History


Genitourinary Surgical Hx: No Pertinent History


Musculskeletal Surgical Hx: Orthopedic Surgery


Male Surgical History: No Pertinent History


Other Surgical History: MELANOMA wide excision no residual 2/15/2018 with 

negative axillary nodes X12: Melanoma REMOVAL Jan 4 2018 back ,left foot x 2, 

and scalp, and forehead past for skin cancer removal





- Social History


Smoking Status: Never smoker


Exposure to second hand smoke: No


Alcohol: None


Drug Use: none





- Physical Exam


Vital Signs: 


 Vital Signs - 24 hr











  Temp Pulse Resp BP Pulse Ox


 


 04/10/18 07:59  99.8 F  96 H  18  98/70  94 L


 


 04/10/18 04:00  98.8 F  93 H  20  132/63  94 L


 


 04/10/18 00:00  100 F  114 H  20  113/59  91 L


 


 04/09/18 23:03  100 F  114 H  20  113/59  90 L


 


 04/09/18 22:58  100 F  114 H  20  113/59  90 L


 


 04/09/18 20:36  102.5 F  104 H  22  105/59  94 L











General Appearance: obese


Neurologic Exam: alert, oriented x 3, cooperative


Eye Exam: PERRL/EOMI, No scleral icterus, No pale conjunctivae


Ears, Nose, Throat Exam: moist mucous membranes


Neck Exam: non-tender, supple


Respiratory Exam: normal breath sounds, lungs clear


Cardiovascular Exam: regular rate/rhythm, normal heart sounds, normal 

peripheral pulses


Gastrointestinal/Abdomen Exam: soft, normal bowel sounds, No tenderness, No 

distention, No mass, No guarding


Extremity Exam: deformities, pedal edema, No calf tenderness


Skin Exam: warm (right foot with 2 scabbed ulcerations on the bottom of the 

foot and a large open deep ulceration on top of the foot adjacent to the 1st 

metataral open with found smelling bloody drainage about 2cm X 1.5 cm with 

extensive warmth and swelling around the wound and chronic deformities from the 

previous surgery with amputation)





Results





- Labs


Lab/Micro Results: 


 Lab Results-Last 24 Hours











  04/10/18 04/10/18 Range/Units





  04:50 04:50 


 


WBC  20.7 H   (4.0-10.5)  K/mm3


 


RBC  4.70   (4.1-5.6)  M/mm3


 


Hgb  14.2   (12.5-18.0)  gm/dl


 


Hct  41.7 L   (42-50)  %


 


MCV  88.7   ()  fl


 


MCH  30.2   (26-32)  pg


 


MCHC  34.1   (32-36)  g/dl


 


RDW  14.3 H   (11.5-14.0)  %


 


Plt Count  156   (150-450)  K/mm3


 


MPV  10.8 H   (6-9.5)  fl


 


Absolute Granulocytes  17.88 H   (1.4-6.9)  


 


Segmented Neutrophils  97 H   (36.-66.)  %


 


Band Neutrophils  3 H   (0.0-2.0)  %


 


Differential Comment  NORMAL   


 


Platelet Estimate  NORMAL   (NORMAL)  


 


Sodium   133 L  (137-145)  mmol/L


 


Potassium   3.8  (3.5-5.1)  mmol/L


 


Chloride   99  ()  mmol/L


 


Carbon Dioxide   22  (22-30)  mmol/L


 


Anion Gap   15.6 H  (5-15)  MEQ/L


 


BUN   25 H  (9-20)  mg/dL


 


Creatinine   1.25  (0.66-1.25)  mg/dL


 


Estimated GFR   > 60.0  ML/MIN


 


Glucose   123 H  ()  mg/dL


 


Calcium   8.2 L  (8.4-10.2)  mg/dL


 


Total Bilirubin   1.30  (0.2-1.3)  mg/dL


 


AST   19  (17-59)  U/L


 


ALT   14  (0-50)  U/L


 


Alkaline Phosphatase   99  ()  U/L


 


Serum Total Protein   7.0  (6.3-8.2)  g/dL


 


Albumin   3.4 L  (3.5-5.0)  g/dL


 


Amylase   68  ()  U/L


 


Lipase   45  ()  U/L














Assessment/Plan


(1) Cellulitis


Current Visit: Yes   Status: Acute   


Qualifiers: 


   Site of cellulitis of extremity: lower extremity   Laterality: left 


Assessment & Plan: 


with sepsis wound culture obtained


Rule out osteomyelitis given history


get xray foot ESR and CRP consider MRI if still doubt


was started on clinda in ED will likely broaden spectrum given severity and 

previous history pending cultures


get labs and xray and discuss surgery consult vs transfer vs podiatry consult


lovenox for ppx


with chronic wounds check arterial ultrasound for viability


Code(s): L03.90 - CELLULITIS, UNSPECIFIED   





(2) Sepsis


Current Visit: Yes   Status: Acute

## 2018-04-11 LAB
ANION GAP SERPL CALC-SCNC: 12.6 MEQ/L (ref 5–15)
BASOPHILS # BLD AUTO: 0.02 10*3/UL (ref 0–0.4)
BASOPHILS NFR BLD AUTO: 0.2 % (ref 0–0.4)
BUN SERPL-MCNC: 18 MG/DL (ref 9–20)
CALCIUM SPEC-MCNC: 8 MG/DL (ref 8.4–10.2)
CHLORIDE SERPL-SCNC: 102 MMOL/L (ref 98–107)
CO2 SERPL-SCNC: 25 MMOL/L (ref 22–30)
CREAT SERPL-MCNC: 1.01 MG/DL (ref 0.66–1.25)
EOSINOPHIL # BLD AUTO: 0.06 10*3/UL (ref 0–0.5)
GLUCOSE SERPL-MCNC: 111 MG/DL (ref 74–106)
GRANULOCYTES # BLD AUTO: 9.31 10*3/UL (ref 1.4–6.9)
HCT VFR BLD AUTO: 37.8 % (ref 42–50)
HGB BLD-MCNC: 12.6 GM/DL (ref 12.5–18)
LYMPHOCYTES # SPEC AUTO: 0.79 10*3/UL (ref 1–4.6)
MCH RBC QN AUTO: 29.7 PG (ref 26–32)
MCHC RBC AUTO-ENTMCNC: 33.3 G/DL (ref 32–36)
MONOCYTES # BLD AUTO: 0.97 10*3/UL (ref 0–1.3)
NEUTROPHILS NFR BLD AUTO: 83.5 % (ref 36–66)
PLATELET # BLD AUTO: 172 K/MM3 (ref 150–450)
POTASSIUM SERPLBLD-SCNC: 3.8 MMOL/L (ref 3.5–5.1)
RBC # BLD AUTO: 4.24 M/MM3 (ref 4.1–5.6)
SODIUM SERPL-SCNC: 136 MMOL/L (ref 137–145)
WBC # BLD AUTO: 11.2 K/MM3 (ref 4–10.5)

## 2018-04-11 RX ADMIN — SODIUM CHLORIDE SCH MLS/HR: 9 INJECTION, SOLUTION INTRAVENOUS at 20:23

## 2018-04-11 RX ADMIN — PIPERACILLIN SODIUM AND TAZOBACTAM SODIUM SCH MLS/HR: .375; 3 INJECTION, POWDER, LYOPHILIZED, FOR SOLUTION INTRAVENOUS at 05:57

## 2018-04-11 RX ADMIN — CLINDAMYCIN IN 5 PERCENT DEXTROSE SCH MLS/HR: 12 INJECTION, SOLUTION INTRAVENOUS at 14:21

## 2018-04-11 RX ADMIN — ACETAMINOPHEN SCH: 325 TABLET ORAL at 12:18

## 2018-04-11 RX ADMIN — PIPERACILLIN SODIUM AND TAZOBACTAM SODIUM SCH: .375; 3 INJECTION, POWDER, LYOPHILIZED, FOR SOLUTION INTRAVENOUS at 12:19

## 2018-04-11 RX ADMIN — CLINDAMYCIN IN 5 PERCENT DEXTROSE SCH MLS/HR: 12 INJECTION, SOLUTION INTRAVENOUS at 05:14

## 2018-04-11 RX ADMIN — ACETAMINOPHEN SCH: 325 TABLET ORAL at 17:19

## 2018-04-11 RX ADMIN — SODIUM CHLORIDE SCH MLS/HR: 9 INJECTION, SOLUTION INTRAVENOUS at 06:53

## 2018-04-11 RX ADMIN — CLINDAMYCIN IN 5 PERCENT DEXTROSE SCH MLS/HR: 12 INJECTION, SOLUTION INTRAVENOUS at 22:06

## 2018-04-11 RX ADMIN — HYDROCODONE BITARTRATE AND ACETAMINOPHEN PRN TAB: 5; 325 TABLET ORAL at 22:07

## 2018-04-11 RX ADMIN — HYDROCODONE BITARTRATE AND ACETAMINOPHEN PRN TAB: 5; 325 TABLET ORAL at 17:20

## 2018-04-11 RX ADMIN — ACETAMINOPHEN SCH MG: 325 TABLET ORAL at 05:19

## 2018-04-11 RX ADMIN — PIPERACILLIN SODIUM AND TAZOBACTAM SODIUM SCH MLS/HR: .375; 3 INJECTION, POWDER, LYOPHILIZED, FOR SOLUTION INTRAVENOUS at 17:20

## 2018-04-11 NOTE — CONS
CONSULT DATE:  04/11/2018     



REASON FOR CONSULT: Left foot swelling.



HISTORY:  This patient has had a chronic swelling of his left foot that has been going on 
for many years. Over the last couple of months it has become very red, swollen and 
spontaneously draining purulent fluid that has been foul-smelling. 



PAST MEDICAL HISTORY:   Melanoma. 



PAST SURGICAL HISTORY:  Melanoma of back excision with left axillary lymph nodes, multiple 
skin lesions on the scalp, ventral hernia repair. 



MEDICATIONS:  None. 



ALLERGIES:   NONE. 

SOCIAL HISTORY:  No tobacco. No alcohol. 



FAMILY HISTORY:  Noncontributory. 



LAB DATA AND TESTS:  



PHYSICAL EXAMINATION:  

GENERAL: No acute distress. 

HEENT: Sclera nonicteric. Extraocular movements intact.

NECK: Supple. No JVD. 

CHEST: Nonlabored breathing. 

ABDOMEN:  Soft, nontender, nondistended. 

EXTREMITIES: Left foot with erythema and massive edema. The erythema extends over 
primarily the first digit up to the mid metatarsal area. There is ulcer and fluctuance 
fluid underneath the ulcer area on the dorsum of the foot over the first metatarsal. He 
also has a couple of small chronic ulcers on the plantar surface. The foot still has 
sensation. The patient denies being diabetic despite looking like diabetic foot ulcer. It 
is not very tender. He said it was extremity tender yesterday.  



LAB DATA AND TESTS: X-ray of the left foot shows gas in subcutaneous tissue as well as 
soft tissue swelling and edema. 



ASSESSMENT:  Left foot abscess. 



PLAN:  Plan for excision and drainage, possible great toe amputation. Continue antibiotics

## 2018-04-11 NOTE — PCM.NOTE
Date and Time: 04/11/18  0747





Subjective Assessment: 





Feeling better today no fever or chills no nausea pain is controlled he has 

decreased feeling in the feet and really isn't having any pain. 





Objective Exam


General Appearance: no apparent distress, alert, obese


Neurologic Exam: alert, oriented x 3, cooperative, normal mood/affect, nml 

cerebellar function, sensation nml, No motor deficits


Eye Exam: PERRL, EOMI, eyes nml inspection


Ears, Nose, Throat Exam: normal ENT inspection, pharynx normal, moist mucous 

membranes


Neck Exam: normal inspection, non-tender, supple, full range of motion


Respiratory Exam: normal breath sounds, lungs clear, No respiratory distress


Cardiovascular Exam: regular rate/rhythm, normal heart sounds


Gastrointestinal/Abdomen Exam: soft, No tenderness, No mass


Extremity Exam: other (left foot with swelling and now has fluctuance adjacent 

to the ulcertion on the dorsal aspect of the foot proximally the swelling is a 

little better but distally worse with more swelling in the great toe the open 

ulceration about 2 cm X 1.5 cm is actively draining a copious amount of 

purulent material no crepitus in the skin. He has the 2 ulcerations on the 

bottom of the foot as well on the left and the chronic deformity from the 

partial amputations on the left)


Back Exam: normal inspection, normal range of motion, No CVA tenderness, No 

vertebral tenderness


Male Genitalia Exam: deferred


Rectal Exam: deferred





OBJECTIVE DATA


Vital Signs: 


 Vital Signs - 24 hr











  Temp Pulse Resp BP Pulse Ox


 


 04/11/18 07:22  98.5 F  93 H  20  146/64  90 L


 


 04/11/18 04:00  98.2 F  88  19  117/58  92 L


 


 04/11/18 00:00  98.7 F  93 H  18  101/57  93 L


 


 04/10/18 20:00  99.1 F  84  19  91/50  93 L


 


 04/10/18 16:00  98.4 F  101 H  20  111/56  94 L


 


 04/10/18 11:27  100 F  88  18  110/65  91 L


 


 04/10/18 07:59  99.8 F  96 H  18  98/70  94 L








 Pain Assessment - Last Documented











Pain Intensity                 0


 


Pain Scale Used                0-10 Pain Scale











Intake and Output: 


 Intake & Output











 04/08/18 04/09/18 04/10/18 04/11/18





 11:59 11:59 11:59 11:59


 


Intake Total    2661


 


Output Total    800


 


Balance    1861


 


Weight    124.7 kg











Lab Results: 


 Lab Results-Last 24 Hours











  04/10/18 04/11/18 04/11/18 Range/Units





  09:36 05:28 05:28 


 


WBC   11.2 H   (4.0-10.5)  K/mm3


 


RBC   4.24   (4.1-5.6)  M/mm3


 


Hgb   12.6   (12.5-18.0)  gm/dl


 


Hct   37.8 L   (42-50)  %


 


MCV   89.2   ()  fl


 


MCH   29.7   (26-32)  pg


 


MCHC   33.3   (32-36)  g/dl


 


RDW   14.4 H   (11.5-14.0)  %


 


Plt Count   172   (150-450)  K/mm3


 


MPV   10.7 H   (6-9.5)  fl


 


Gran %   83.5 H   (36.0-66.0)  %


 


Eos # (Auto)   0.06   (0-0.5)  


 


Absolute Lymphs (auto)   0.79 L   (1.0-4.6)  


 


Absolute Monos (auto)   0.97   (0.0-1.3)  


 


Lymphocytes %   7.1 L   (24.0-44.0)  %


 


Monocytes %   8.7   (0.0-12.0)  %


 


Eosinophils %   0.5   (0.00-5.0)  %


 


Basophils %   0.2   (0.0-0.4)  %


 


Absolute Granulocytes   9.31 H   (1.4-6.9)  


 


Basophils #   0.02   (0-0.4)  


 


ESR  10    (0-15)  mm/hr


 


Sodium    136 L  (137-145)  mmol/L


 


Potassium    3.8  (3.5-5.1)  mmol/L


 


Chloride    102  ()  mmol/L


 


Carbon Dioxide    25  (22-30)  mmol/L


 


Anion Gap    12.6  (5-15)  MEQ/L


 


BUN    18  (9-20)  mg/dL


 


Creatinine    1.01  (0.66-1.25)  mg/dL


 


Estimated GFR    > 60.0  ML/MIN


 


Glucose    111 H  ()  mg/dL


 


Calcium    8.0 L  (8.4-10.2)  mg/dL











Radiology Exams: 


 Radiology Procedures











 Category Date Time Status


 


 ARTERIAL UNILAT/LTD LOWER EXT [US] Routine Exams  04/10/18 11:04 Completed


 


 FOOT (MINIMUM 3 VIEWS) Stat Exams  04/10/18 08:59 Completed














Assessment/Plan


(1) Necrotizing cellulitis


Current Visit: Yes   Status: Acute   


Assessment & Plan: 


draining purulent material more fluctance now then yesterday it appears has 

planned I and D today with Dr. Tsai


the sepsis is improving with the vanc + zosyn + clinda 


monitor cultures


Code(s): L03.90 - CELLULITIS, UNSPECIFIED   





(2) Sepsis


Current Visit: Yes   Status: Acute   





(3) Bacteremia


Current Visit: Yes   Status: Acute   Code(s): R78.81 - BACTEREMIA

## 2018-04-12 RX ADMIN — SODIUM CHLORIDE SCH MLS/HR: 9 INJECTION, SOLUTION INTRAVENOUS at 18:52

## 2018-04-12 RX ADMIN — ENOXAPARIN SODIUM SCH MG: 100 INJECTION SUBCUTANEOUS at 10:06

## 2018-04-12 RX ADMIN — PIPERACILLIN SODIUM AND TAZOBACTAM SODIUM SCH MLS/HR: .375; 3 INJECTION, POWDER, LYOPHILIZED, FOR SOLUTION INTRAVENOUS at 17:31

## 2018-04-12 RX ADMIN — CLINDAMYCIN IN 5 PERCENT DEXTROSE SCH MLS/HR: 12 INJECTION, SOLUTION INTRAVENOUS at 13:33

## 2018-04-12 RX ADMIN — ACETAMINOPHEN SCH: 325 TABLET ORAL at 05:57

## 2018-04-12 RX ADMIN — ACETAMINOPHEN SCH: 325 TABLET ORAL at 00:51

## 2018-04-12 RX ADMIN — PIPERACILLIN SODIUM AND TAZOBACTAM SODIUM SCH MLS/HR: .375; 3 INJECTION, POWDER, LYOPHILIZED, FOR SOLUTION INTRAVENOUS at 23:00

## 2018-04-12 RX ADMIN — SODIUM CHLORIDE SCH MLS/HR: 9 INJECTION, SOLUTION INTRAVENOUS at 07:21

## 2018-04-12 RX ADMIN — PIPERACILLIN SODIUM AND TAZOBACTAM SODIUM SCH MLS/HR: .375; 3 INJECTION, POWDER, LYOPHILIZED, FOR SOLUTION INTRAVENOUS at 06:25

## 2018-04-12 RX ADMIN — PIPERACILLIN SODIUM AND TAZOBACTAM SODIUM SCH MLS/HR: .375; 3 INJECTION, POWDER, LYOPHILIZED, FOR SOLUTION INTRAVENOUS at 00:47

## 2018-04-12 RX ADMIN — CLINDAMYCIN IN 5 PERCENT DEXTROSE SCH MLS/HR: 12 INJECTION, SOLUTION INTRAVENOUS at 05:33

## 2018-04-12 RX ADMIN — CLINDAMYCIN IN 5 PERCENT DEXTROSE SCH MLS/HR: 12 INJECTION, SOLUTION INTRAVENOUS at 21:54

## 2018-04-12 RX ADMIN — PIPERACILLIN SODIUM AND TAZOBACTAM SODIUM SCH MLS/HR: .375; 3 INJECTION, POWDER, LYOPHILIZED, FOR SOLUTION INTRAVENOUS at 11:58

## 2018-04-12 NOTE — PCM.NOTE
Date and Time: 04/12/18 0945





Subjective Assessment: 





doing well now no pain


lots of drainage post op


slept better last night


no fevers. 





Objective Exam


General Appearance: no apparent distress, alert


Neurologic Exam: alert, oriented x 3, cooperative, normal mood/affect, nml 

cerebellar function, sensation nml, No motor deficits


Skin Exam: normal color, warm, dry


Eye Exam: PERRL, EOMI, eyes nml inspection


Ears, Nose, Throat Exam: normal ENT inspection, pharynx normal, moist mucous 

membranes


Neck Exam: normal inspection, non-tender, supple, full range of motion


Respiratory Exam: normal breath sounds, lungs clear, No respiratory distress


Cardiovascular Exam: regular rate/rhythm, normal heart sounds


Gastrointestinal/Abdomen Exam: soft, No tenderness, No mass


Extremity Exam: other (right foot with surgical bandage still in place with 

bloody drainage at end no warmth proximally)


Back Exam: normal inspection, normal range of motion, No CVA tenderness, No 

vertebral tenderness


Male Genitalia Exam: deferred


Rectal Exam: deferred





OBJECTIVE DATA


Vital Signs: 


 Vital Signs - 24 hr











  Temp Pulse Resp BP Pulse Ox


 


 04/12/18 08:00  98.1 F  90  20  130/84  98


 


 04/12/18 03:55  99.0 F  82  18  126/90  94 L


 


 04/12/18 00:00    18  


 


 04/11/18 20:00  99.9 F  86  18  105/64  94 L


 


 04/11/18 16:30  99.1 F  91 H  20  114/60  93 L


 


 04/11/18 15:30  99.8 F  93 H  20  113/59  92 L


 


 04/11/18 14:30  99.6 F  95 H  20  116/61  92 L


 


 04/11/18 14:00  98.4 F  94 H  18  104/64  92 L


 


 04/11/18 13:30  98.4 F  91 H  18  106/63  93 L


 


 04/11/18 13:15  98.4 F  89  18  113/74  91 L


 


 04/11/18 11:40  98.5 F  93 H  20  110/73  97








 Pain Assessment - Last Documented











Pain Intensity                 0


 


Pain Scale Used                [facility]











Intake and Output: 


 Intake & Output











 04/09/18 04/10/18 04/11/18 04/12/18





 11:59 11:59 11:59 11:59


 


Intake Total   2781 4170


 


Output Total   800 1075


 


Balance   1981 3095


 


Weight   124.7 kg 











Lab Results: 


 Lab Results-Last 24 Hours











  04/12/18 Range/Units





  06:30 


 


Vancomycin Trough  10.24 H  (5-10)  ug/mL











Radiology Exams: 


 Radiology Procedures











 Category Date Time Status


 


 ARTERIAL UNILAT/LTD LOWER EXT [US] Routine Exams  04/10/18 11:04 Completed


 


 FOOT (MINIMUM 3 VIEWS) Stat Exams  04/10/18 08:59 Completed














Assessment/Plan


(1) Necrotizing cellulitis


Current Visit: Yes   Status: Acute   Onset Date: ~04/10/18   


Assessment & Plan: 


cultures still pending 2/2 blood culture positive


pod 1 of great toe removal and debridement of necrotic tissue by Dr. LAMONT Tsai


continue vanc, zosyn, clinda pending cultures


wound care per surgery orders


checking with them will undress and check infection if no specific orders 


Code(s): L03.90 - CELLULITIS, UNSPECIFIED   





(2) Sepsis


Current Visit: Yes   Status: Acute   Onset Date: ~04/10/18   





(3) Bacteremia


Current Visit: Yes   Status: Acute   Onset Date: ~04/10/18   Code(s): R78.81 - 

BACTEREMIA   





(4) Osteomyelitis


Current Visit: Yes   Status: Acute   


Assessment & Plan: 


right great toe removed pod 1


Code(s): M86.9 - OSTEOMYELITIS, UNSPECIFIED

## 2018-04-12 NOTE — OP
SURGERY DATE/TIME:    04/11/2018  1127 



PREOPERATIVE DIAGNOSIS:          Left foot abscess with wet gangrene.



POSTOPERATIVE DIAGNOSES:     Left foot abscess with wet gangrene and osteomyelitis. 



PROCEDURES:    

1) I&D of left foot abscess with debridement of skin, soft tissue and bone. 

2) Left great toe amputation with partial first metatarsal amputation. 

 

SURGEON:        Javed Tsai M.D.



ANESTHESIA:    General.



SPECIMEN:   Swab for culture, tissue for culture, right great toe and metatarsal. 



ESTIMATED BLOOD LOSS:  50 cc. 



COMPLICATIONS:    None.



FINDINGS: Wet gangrene of the right foot with involvement of the bone of the first digit. 



PATIENT PRESENTATION: This 67 year-old male presents with several weeks of worsening 
redness and drainage from his left foot. He presented to the emergency department and was 
admitted and placed on IV antibiotics. Plain x-rays showed gas in the subcutaneous tissue 
and so he was consulted for debridement. After discussing risks and benefits of incision 
and drainage with possible need for great toe amputation the patient wished to proceed. 



DESCRIPTION OF PROCEDURE:  The patient was brought to the operating room. The left foot 
was prepped and draped in sterile fashion. The patient was placed under general 
endotracheal anesthesia. There is a large ulcer near the end of the great toe metatarsal 
and this was probed this probed all the way to bone and there was purulent fluid around 
the bone of the great toe. There was actually free fluid and a piece of the bone that had 
likely been infected for quite some time. Decision was then made to proceed with great toe 
amputation. A V-shaped incision was made starting just above the ulcer and the toe was 
wedged out with electrocautery. The tendons were cut with electrocautery. Hemostasis was 
achieved with electrocautery. There was a very thick calcified tendon along the whole 
plantar surface of the foot which was also removed. The toe was then removed and some of 
the soft tissue and bone were sent for culture and the rest were sent for pathology. The 
metatarsal was then exposed and it was cut back with bone cutter and removed with rongeur. 
The wound was then copiously irrigated. Hemostasis was achieved. Surgicel was placed in 
the bed of the wound. The wound was packed with wet Kerlix and dressing was applied. The 
patient was recovered and taken to PACU in stable condition with plans for either eventual 
Wound-Vac therapy or delayed primary closure.

## 2018-04-13 RX ADMIN — ENOXAPARIN SODIUM SCH MG: 100 INJECTION SUBCUTANEOUS at 11:36

## 2018-04-13 RX ADMIN — CLINDAMYCIN IN 5 PERCENT DEXTROSE SCH MLS/HR: 12 INJECTION, SOLUTION INTRAVENOUS at 23:05

## 2018-04-13 RX ADMIN — PIPERACILLIN SODIUM AND TAZOBACTAM SODIUM SCH MLS/HR: .375; 3 INJECTION, POWDER, LYOPHILIZED, FOR SOLUTION INTRAVENOUS at 06:36

## 2018-04-13 RX ADMIN — SODIUM CHLORIDE SCH MLS/HR: 9 INJECTION, SOLUTION INTRAVENOUS at 19:54

## 2018-04-13 RX ADMIN — CLINDAMYCIN IN 5 PERCENT DEXTROSE SCH MLS/HR: 12 INJECTION, SOLUTION INTRAVENOUS at 14:38

## 2018-04-13 RX ADMIN — PIPERACILLIN SODIUM AND TAZOBACTAM SODIUM SCH MLS/HR: .375; 3 INJECTION, POWDER, LYOPHILIZED, FOR SOLUTION INTRAVENOUS at 19:04

## 2018-04-13 RX ADMIN — PIPERACILLIN SODIUM AND TAZOBACTAM SODIUM SCH MLS/HR: .375; 3 INJECTION, POWDER, LYOPHILIZED, FOR SOLUTION INTRAVENOUS at 13:25

## 2018-04-13 RX ADMIN — CLINDAMYCIN IN 5 PERCENT DEXTROSE SCH MLS/HR: 12 INJECTION, SOLUTION INTRAVENOUS at 05:46

## 2018-04-13 RX ADMIN — SODIUM CHLORIDE SCH MLS/HR: 9 INJECTION, SOLUTION INTRAVENOUS at 08:22

## 2018-04-13 NOTE — PCM.NOTE
Date and Time: 04/13/18  0827








OBJECTIVE DATA


Vital Signs: 


 Vital Signs - 24 hr











  Temp Pulse Resp BP Pulse Ox


 


 04/13/18 07:01  98.7 F  82  20  147/89  97


 


 04/13/18 04:00  97.0 F  70  18  116/68  98


 


 04/12/18 23:53    20  


 


 04/12/18 19:50  97.9 F  83  18  122/74  94 L


 


 04/12/18 16:00  97.9 F  78  18  116/81  97


 


 04/12/18 12:00  97.9 F  80  20  119/77  96








 Pain Assessment - Last Documented











Pain Intensity                 0


 


Pain Scale Used                0-10 Pain Scale











Intake and Output: 


 Intake & Output











 04/10/18 04/11/18 04/12/18 04/13/18





 11:59 11:59 11:59 11:59


 


Intake Total  2781 4170 3850


 


Output Total  800 1075 1825


 


Balance  1981 3095 2025


 


Weight  124.7 kg 124.4 kg 124 kg











Multi-Disciplinary Progress Notes: 


 Multi-Disciplinary Progress Notes





04/12/18 11:22 Pharmacy Note by Philip Miguel


Vancomycin trough therapeutic at 10.24. Creatinine improving. Keep same dose. 





Initialized on 04/12/18 11:22 - END OF NOTE

















Assessment/Plan


(1) Necrotizing cellulitis


Current Visit: Yes   Status: Acute   Onset Date: ~04/10/18   


Assessment & Plan: 


with wound culture with MRSA and Beta hemolytic strep as well as 2/2 blood 

cultures with beta hemolytic strep pending ID


awaiting official report and sensitivities continue current abx for now and 

adjust based on cultures


with the bacteremia plan for at least 2 weeks abx


with him afebrile now will have picc placed for long term outpatient antibiotic 

infusions as he has had difficulty keepnig a peripheral iv





concern with the redness of the 2nd toe that was not amputated and swelling 

around this as to possible involvement of cellulitis in this bone


Surgery recommends long coarse of antibiotics and agree with the concern on the 

second toe an the bacteremia for possible 4 week coarse will get picc line


Code(s): L03.90 - CELLULITIS, UNSPECIFIED   





(2) Sepsis


Current Visit: Yes   Status: Acute   Onset Date: ~04/10/18   





(3) Bacteremia


Current Visit: Yes   Status: Acute   Onset Date: ~04/10/18   


Assessment & Plan: 


Beta hemolytic strep in both bottles pending ID at reference lab


Code(s): R78.81 - BACTEREMIA   





(4) Osteomyelitis


Current Visit: Yes   Status: Acute   Onset Date: ~04/11/18   Code(s): M86.9 - 

OSTEOMYELITIS, UNSPECIFIED

## 2018-04-13 NOTE — XRAY
Indication: Ultrasound guidance for PICC line placement.



Initial sonographic imaging of the right upper extremity was performed for

localization of patent veins.  A patent basilic vein identified above the

elbow.  Ultrasound guidance was then used for PICC line insertion.  Full PICC

line insertion is reported separately.

## 2018-04-14 LAB
ALBUMIN SERPL-MCNC: 2.8 G/DL (ref 3.5–5)
ALP SERPL-CCNC: 89 U/L (ref 38–126)
ALT SERPL-CCNC: 25 U/L (ref 0–50)
ANION GAP SERPL CALC-SCNC: 10.8 MEQ/L (ref 5–15)
AST SERPL QL: 37 U/L (ref 17–59)
BILIRUB BLD-MCNC: 0.3 MG/DL (ref 0.2–1.3)
BUN SERPL-MCNC: 8 MG/DL (ref 9–20)
CALCIUM SPEC-MCNC: 8.1 MG/DL (ref 8.4–10.2)
CELLS COUNTED: 100
CHLORIDE SERPL-SCNC: 103 MMOL/L (ref 98–107)
CO2 SERPL-SCNC: 28 MMOL/L (ref 22–30)
CREAT SERPL-MCNC: 0.72 MG/DL (ref 0.66–1.25)
GLUCOSE SERPL-MCNC: 106 MG/DL (ref 74–106)
GRANULOCYTES # BLD AUTO: 2.49 10*3/UL (ref 1.4–6.9)
HCT VFR BLD AUTO: 37 % (ref 42–50)
HGB BLD-MCNC: 12.2 GM/DL (ref 12.5–18)
MANUAL DIF COMMENT BLD-IMP: (no result)
MCH RBC QN AUTO: 29.3 PG (ref 26–32)
MCHC RBC AUTO-ENTMCNC: 33 G/DL (ref 32–36)
NEUTS BAND # BLD MANUAL: 4 % (ref 0–2)
PLATELET # BLD AUTO: 201 K/MM3 (ref 150–450)
POTASSIUM SERPLBLD-SCNC: 3.8 MMOL/L (ref 3.5–5.1)
PROT SERPL-MCNC: 6 G/DL (ref 6.3–8.2)
RBC # BLD AUTO: 4.15 M/MM3 (ref 4.1–5.6)
SODIUM SERPL-SCNC: 138 MMOL/L (ref 137–145)
TOXIC GRANULES BLD QL SMEAR: (no result)
WBC # BLD AUTO: 4.7 K/MM3 (ref 4–10.5)

## 2018-04-14 RX ADMIN — ENOXAPARIN SODIUM SCH MG: 100 INJECTION SUBCUTANEOUS at 10:30

## 2018-04-14 RX ADMIN — SODIUM CHLORIDE SCH MLS/HR: 9 INJECTION, SOLUTION INTRAVENOUS at 18:24

## 2018-04-14 RX ADMIN — CLINDAMYCIN IN 5 PERCENT DEXTROSE SCH MLS/HR: 12 INJECTION, SOLUTION INTRAVENOUS at 21:49

## 2018-04-14 RX ADMIN — PIPERACILLIN SODIUM AND TAZOBACTAM SODIUM SCH MLS/HR: .375; 3 INJECTION, POWDER, LYOPHILIZED, FOR SOLUTION INTRAVENOUS at 01:05

## 2018-04-14 RX ADMIN — HEPARIN SODIUM (PORCINE) LOCK FLUSH IV SOLN 100 UNIT/ML PRN UNITS: 100 SOLUTION at 19:27

## 2018-04-14 RX ADMIN — PIPERACILLIN SODIUM AND TAZOBACTAM SODIUM SCH MLS/HR: .375; 3 INJECTION, POWDER, LYOPHILIZED, FOR SOLUTION INTRAVENOUS at 17:41

## 2018-04-14 RX ADMIN — PIPERACILLIN SODIUM AND TAZOBACTAM SODIUM SCH MLS/HR: .375; 3 INJECTION, POWDER, LYOPHILIZED, FOR SOLUTION INTRAVENOUS at 12:20

## 2018-04-14 RX ADMIN — PIPERACILLIN SODIUM AND TAZOBACTAM SODIUM SCH MLS/HR: .375; 3 INJECTION, POWDER, LYOPHILIZED, FOR SOLUTION INTRAVENOUS at 23:52

## 2018-04-14 RX ADMIN — PIPERACILLIN SODIUM AND TAZOBACTAM SODIUM SCH MLS/HR: .375; 3 INJECTION, POWDER, LYOPHILIZED, FOR SOLUTION INTRAVENOUS at 06:42

## 2018-04-14 RX ADMIN — CLINDAMYCIN IN 5 PERCENT DEXTROSE SCH MLS/HR: 12 INJECTION, SOLUTION INTRAVENOUS at 05:56

## 2018-04-14 RX ADMIN — SODIUM CHLORIDE SCH MLS/HR: 9 INJECTION, SOLUTION INTRAVENOUS at 07:56

## 2018-04-14 RX ADMIN — CLINDAMYCIN IN 5 PERCENT DEXTROSE SCH MLS/HR: 12 INJECTION, SOLUTION INTRAVENOUS at 13:46

## 2018-04-14 NOTE — PCM.NOTE
Date and Time: 04/14/18 0927





Subjective Assessment: 





patient with no complaints today, tolerating po intake and not having much pain 

in the foot.





Objective Exam


General Appearance: no apparent distress, alert, obese


Eye Exam: PERRL, EOMI, eyes nml inspection


Respiratory Exam: normal breath sounds, lungs clear, No respiratory distress


Cardiovascular Exam: regular rate/rhythm, normal heart sounds


Extremity Exam: other (left foot unwrapped, great toe surgically absent with 

wound bed clean, serous drainage. mild erythema and soft tissue at base of 

second toe)





OBJECTIVE DATA


Vital Signs: 


 Vital Signs - 24 hr











  Temp Pulse Resp BP Pulse Ox


 


 04/14/18 07:16  97.6 F  67  18  103/70  95


 


 04/14/18 04:00  98.2 F  71  18  117/76  96


 


 04/14/18 00:00  97.9 F  70  18  121/65  91 L


 


 04/13/18 20:00  98.1 F  73  22  135/80  95


 


 04/13/18 16:00  96.5 F  74  16  139/92  96


 


 04/13/18 12:16  98.1 F  74  20  147/92  96








 Pain Assessment - Last Documented











Pain Intensity                 0


 


Pain Scale Used                0-10 Pain Scale











Intake and Output: 


 Intake & Output











 04/11/18 04/12/18 04/13/18 04/14/18





 11:59 11:59 11:59 11:59


 


Intake Total 2781 4170 3850 2431


 


Output Total 800 1075 1825 450


 


Balance 1981 3095 2025 1981


 


Weight 124.7 kg 124.4 kg 124 kg 124.3 kg











Lab Results: 


 Lab Results-Last 24 Hours











  04/14/18 04/14/18 Range/Units





  05:45 05:45 


 


WBC  4.7   (4.0-10.5)  K/mm3


 


RBC  4.15   (4.1-5.6)  M/mm3


 


Hgb  12.2 L   (12.5-18.0)  gm/dl


 


Hct  37.0 L   (42-50)  %


 


MCV  89.2   ()  fl


 


MCH  29.3   (26-32)  pg


 


MCHC  33.0   (32-36)  g/dl


 


RDW  13.9   (11.5-14.0)  %


 


Plt Count  201   (150-450)  K/mm3


 


MPV  10.9 H   (6-9.5)  fl


 


Absolute Granulocytes  2.49   (1.4-6.9)  


 


Sodium   138  (137-145)  mmol/L


 


Potassium   3.8  (3.5-5.1)  mmol/L


 


Chloride   103  ()  mmol/L


 


Carbon Dioxide   28  (22-30)  mmol/L


 


Anion Gap   10.8  (5-15)  MEQ/L


 


BUN   8 L  (9-20)  mg/dL


 


Creatinine   0.72  (0.66-1.25)  mg/dL


 


Estimated GFR   > 60.0  ML/MIN


 


Glucose   106  ()  mg/dL


 


Calcium   8.1 L  (8.4-10.2)  mg/dL


 


Total Bilirubin   0.30  (0.2-1.3)  mg/dL


 


AST   37  (17-59)  U/L


 


ALT   25  (0-50)  U/L


 


Alkaline Phosphatase   89  ()  U/L


 


Serum Total Protein   6.0 L  (6.3-8.2)  g/dL


 


Albumin   2.8 L  (3.5-5.0)  g/dL











Radiology Exams: 


 Radiology Procedures











 Category Date Time Status


 


 GUIDANCE FOR NEEDLE PLACEMENT [US] Routine Exams  04/13/18 09:13 Completed


 


 PICC LINE PLACEMENT Routine Exams  04/13/18 10:34 Completed











Multi-Disciplinary Progress Notes: 


 Multi-Disciplinary Progress Notes





04/13/18 09:45 (created 04/13/18 14:59) Case Management Note by Makenzie Betancourt


DISCHARGE PLAN REVIEWED WITH PT.  REPORTS THAT HE LIVES 2 MILES FROM HERE AND 

IS PLANNING TO RETURN TO [facility] FOR CONTINUED IV ABX AND WOUND CARE 

WHEN PHYSICIAN FEELS HE CAN DISCHARGE HOME.  DID DISCUSS SWING BED FOR IV ABX 

AND WOUND CARE.  PT IS AGREEABLE FOR THIS PLAN FOR SHORT TERM TREATMENT.  

VERBALIZED UNDERSTANDING THAT HE WILL REQUIRE ABX AND WOUND CARE.  REPORTS THAT 

HIS WOUND CULTURE IS ALSO STILL PENDING.  WILL EVENTUALLY RETURN HOME WITH 

[facility].  BUT [facility] WORKS FULL TIME.  PT REPORTS THAT HE DOES NOT HAVE ANYONE 

ELSE TO DRIVE HIM TO HOSPITAL DAILY, DID DISCUSS TRANSPORTATION PROVIDERS.  

WILL GIVE LIST OF PROVIDERS PRIOR TO DISCHARGE.  DECLINED ADDNL NEEDS AT 

PRESENT.  WILL FOLLOW. 





Initialized on 04/13/18 14:59 - END OF NOTE

















Assessment/Plan


(1) Necrotizing cellulitis


Current Visit: Yes   Status: Acute   Onset Date: ~04/10/18   


Assessment & Plan: 


on cleocin and vanc, wound culture with MRSA, still no ID on blood cultures 

with gram positive cocci in chains 2/2 apparently sent to reference lab, still 

no final report so will continue current regimen. considering wound vac, likely 

discharge on monday


Code(s): L03.90 - CELLULITIS, UNSPECIFIED   





(2) Bacteremia


Current Visit: Yes   Status: Acute   Onset Date: ~04/10/18   Code(s): R78.81 - 

BACTEREMIA   





(3) Sepsis


Current Visit: Yes   Status: Acute   Onset Date: ~04/10/18

## 2018-04-15 LAB
ANION GAP SERPL CALC-SCNC: 10.2 MEQ/L (ref 5–15)
BUN SERPL-MCNC: 7 MG/DL (ref 9–20)
CALCIUM SPEC-MCNC: 8.1 MG/DL (ref 8.4–10.2)
CELLS COUNTED: 100
CHLORIDE SERPL-SCNC: 103 MMOL/L (ref 98–107)
CO2 SERPL-SCNC: 27 MMOL/L (ref 22–30)
CREAT SERPL-MCNC: 0.71 MG/DL (ref 0.66–1.25)
GLUCOSE SERPL-MCNC: 96 MG/DL (ref 74–106)
GRANULOCYTES # BLD AUTO: 2.83 10*3/UL (ref 1.4–6.9)
HCT VFR BLD AUTO: 37.3 % (ref 42–50)
HGB BLD-MCNC: 12.3 GM/DL (ref 12.5–18)
MANUAL DIF COMMENT BLD-IMP: (no result)
MCH RBC QN AUTO: 29.2 PG (ref 26–32)
MCHC RBC AUTO-ENTMCNC: 33 G/DL (ref 32–36)
PLATELET # BLD AUTO: 214 K/MM3 (ref 150–450)
POLYCHROMASIA BLD QL SMEAR: (no result)
POTASSIUM SERPLBLD-SCNC: 4.3 MMOL/L (ref 3.5–5.1)
RBC # BLD AUTO: 4.2 M/MM3 (ref 4.1–5.6)
SODIUM SERPL-SCNC: 136 MMOL/L (ref 137–145)
TOXIC GRANULES BLD QL SMEAR: (no result)
VARIANT LYMPHS BLD QL SMEAR: 4 %
WBC # BLD AUTO: 5.4 K/MM3 (ref 4–10.5)

## 2018-04-15 RX ADMIN — ENOXAPARIN SODIUM SCH MG: 100 INJECTION SUBCUTANEOUS at 10:28

## 2018-04-15 RX ADMIN — PIPERACILLIN SODIUM AND TAZOBACTAM SODIUM SCH MLS/HR: .375; 3 INJECTION, POWDER, LYOPHILIZED, FOR SOLUTION INTRAVENOUS at 05:44

## 2018-04-15 RX ADMIN — CLINDAMYCIN IN 5 PERCENT DEXTROSE SCH MLS/HR: 12 INJECTION, SOLUTION INTRAVENOUS at 14:40

## 2018-04-15 RX ADMIN — PIPERACILLIN SODIUM AND TAZOBACTAM SODIUM SCH MLS/HR: .375; 3 INJECTION, POWDER, LYOPHILIZED, FOR SOLUTION INTRAVENOUS at 17:06

## 2018-04-15 RX ADMIN — CLINDAMYCIN IN 5 PERCENT DEXTROSE SCH MLS/HR: 12 INJECTION, SOLUTION INTRAVENOUS at 05:10

## 2018-04-15 RX ADMIN — SODIUM CHLORIDE SCH MLS/HR: 9 INJECTION, SOLUTION INTRAVENOUS at 06:28

## 2018-04-15 RX ADMIN — SODIUM CHLORIDE SCH MLS/HR: 9 INJECTION, SOLUTION INTRAVENOUS at 08:23

## 2018-04-15 RX ADMIN — SODIUM CHLORIDE SCH MLS/HR: 9 INJECTION, SOLUTION INTRAVENOUS at 19:29

## 2018-04-15 RX ADMIN — CLINDAMYCIN IN 5 PERCENT DEXTROSE SCH MLS/HR: 12 INJECTION, SOLUTION INTRAVENOUS at 21:57

## 2018-04-15 RX ADMIN — PIPERACILLIN SODIUM AND TAZOBACTAM SODIUM SCH MLS/HR: .375; 3 INJECTION, POWDER, LYOPHILIZED, FOR SOLUTION INTRAVENOUS at 11:56

## 2018-04-15 NOTE — PCM.NOTE
Date and Time: 04/15/18  0836





Subjective Assessment: 





patient having some diarrhea this morning, tolerating regular diet. no new 

complaints





Objective Exam


General Appearance: no apparent distress, alert, obese


Skin Exam: normal color, warm, dry


Respiratory Exam: normal breath sounds, lungs clear, No respiratory distress


Cardiovascular Exam: regular rate/rhythm, normal heart sounds


Gastrointestinal/Abdomen Exam: soft, No tenderness, No mass


Extremity Exam: other (moderate swelling 2nd toe on left foot, great toe 

surgically absent)





OBJECTIVE DATA


Vital Signs: 


 Vital Signs - 24 hr











  Temp Pulse Resp BP Pulse Ox


 


 04/15/18 07:19  97.7 F  66  18  145/90  97


 


 04/15/18 04:15  98.3 F  63  16  131/77  95


 


 04/14/18 23:04  98.3 F  73  24  143/89  95


 


 04/14/18 19:15  98.3 F  74  20  123/76  96


 


 04/14/18 16:53  98.3 F  72  20  140/91  97


 


 04/14/18 11:28  97.7 F  73  18  129/73  95








 Pain Assessment - Last Documented











Pain Intensity                 0


 


Pain Scale Used                0-10 Pain Scale











Intake and Output: 


 Intake & Output











 04/12/18 04/13/18 04/14/18 04/15/18





 11:59 11:59 11:59 11:59


 


Intake Total 4170 3850 2431 3428


 


Output Total 1075 3743 998 1965


 


Balance 3095 2025 1981 2428


 


Weight 124.4 kg 124 kg 124.3 kg 











Lab Results: 


 Lab Results-Last 24 Hours











  04/14/18 04/15/18 04/15/18 Range/Units





  05:45 05:35 05:35 


 


WBC   5.4   (4.0-10.5)  K/mm3


 


RBC   4.20   (4.1-5.6)  M/mm3


 


Hgb   12.3 L   (12.5-18.0)  gm/dl


 


Hct   37.3 L   (42-50)  %


 


MCV   88.8   ()  fl


 


MCH   29.2   (26-32)  pg


 


MCHC   33.0   (32-36)  g/dl


 


RDW   13.9   (11.5-14.0)  %


 


Plt Count   214   (150-450)  K/mm3


 


MPV   10.9 H   (6-9.5)  fl


 


Absolute Granulocytes   2.83   (1.4-6.9)  


 


Segmented Neutrophils  54    (36.-66.)  %


 


Band Neutrophils  4 H    (0.0-2.0)  %


 


Lymphocytes (Manual)  24    (24-44)  %


 


Monocytes (Manual)  9    (0.0-12.0)  %


 


Eosinophils (Manual)  9 H    (0.00-3.0)  %


 


Toxic Granulation  2+    


 


Platelet Estimate  NORMAL    (NORMAL)  


 


RBC Morphology  ABNORMAL    


 


Sodium    136 L  (137-145)  mmol/L


 


Potassium    4.3  (3.5-5.1)  mmol/L


 


Chloride    103  ()  mmol/L


 


Carbon Dioxide    27  (22-30)  mmol/L


 


Anion Gap    10.2  (5-15)  MEQ/L


 


BUN    7 L  (9-20)  mg/dL


 


Creatinine    0.71  (0.66-1.25)  mg/dL


 


Estimated GFR    > 60.0  ML/MIN


 


Glucose    96  ()  mg/dL


 


Calcium    8.1 L  (8.4-10.2)  mg/dL


 


Vancomycin Trough     (5-10)  ug/mL














  04/15/18 Range/Units





  05:35 


 


WBC   (4.0-10.5)  K/mm3


 


RBC   (4.1-5.6)  M/mm3


 


Hgb   (12.5-18.0)  gm/dl


 


Hct   (42-50)  %


 


MCV   ()  fl


 


MCH   (26-32)  pg


 


MCHC   (32-36)  g/dl


 


RDW   (11.5-14.0)  %


 


Plt Count   (150-450)  K/mm3


 


MPV   (6-9.5)  fl


 


Absolute Granulocytes   (1.4-6.9)  


 


Segmented Neutrophils   (36.-66.)  %


 


Band Neutrophils   (0.0-2.0)  %


 


Lymphocytes (Manual)   (24-44)  %


 


Monocytes (Manual)   (0.0-12.0)  %


 


Eosinophils (Manual)   (0.00-3.0)  %


 


Toxic Granulation   


 


Platelet Estimate   (NORMAL)  


 


RBC Morphology   


 


Sodium   (137-145)  mmol/L


 


Potassium   (3.5-5.1)  mmol/L


 


Chloride   ()  mmol/L


 


Carbon Dioxide   (22-30)  mmol/L


 


Anion Gap   (5-15)  MEQ/L


 


BUN   (9-20)  mg/dL


 


Creatinine   (0.66-1.25)  mg/dL


 


Estimated GFR   ML/MIN


 


Glucose   ()  mg/dL


 


Calcium   (8.4-10.2)  mg/dL


 


Vancomycin Trough  13.91 H  (5-10)  ug/mL











Radiology Exams: 


 Radiology Procedures











 Category Date Time Status


 


 GUIDANCE FOR NEEDLE PLACEMENT [US] Routine Exams  04/13/18 09:13 Completed


 


 PICC LINE PLACEMENT Routine Exams  04/13/18 10:34 Completed














Assessment/Plan


(1) Necrotizing cellulitis


Current Visit: Yes   Status: Acute   Onset Date: ~04/10/18   


Assessment & Plan: 


continue vanc and clindamycin, blood culture final ID pending.


Code(s): L03.90 - CELLULITIS, UNSPECIFIED   





(2) Bacteremia


Current Visit: Yes   Status: Acute   Onset Date: ~04/10/18   


Assessment & Plan: 


still no final on the blood culture and sensitivity


Code(s): R78.81 - BACTEREMIA   





(3) Sepsis


Current Visit: Yes   Status: Acute   Onset Date: ~04/10/18

## 2018-04-16 RX ADMIN — SODIUM CHLORIDE SCH MLS/HR: 9 INJECTION, SOLUTION INTRAVENOUS at 08:26

## 2018-04-16 RX ADMIN — CLINDAMYCIN IN 5 PERCENT DEXTROSE SCH MLS/HR: 12 INJECTION, SOLUTION INTRAVENOUS at 05:57

## 2018-04-16 RX ADMIN — CEFTRIAXONE SCH MLS/HR: 2 INJECTION, SOLUTION INTRAVENOUS at 10:11

## 2018-04-16 RX ADMIN — CLINDAMYCIN IN 5 PERCENT DEXTROSE SCH MLS/HR: 12 INJECTION, SOLUTION INTRAVENOUS at 22:20

## 2018-04-16 RX ADMIN — PIPERACILLIN SODIUM AND TAZOBACTAM SODIUM SCH MLS/HR: .375; 3 INJECTION, POWDER, LYOPHILIZED, FOR SOLUTION INTRAVENOUS at 06:54

## 2018-04-16 RX ADMIN — HEPARIN SODIUM (PORCINE) LOCK FLUSH IV SOLN 100 UNIT/ML PRN UNITS: 100 SOLUTION at 10:12

## 2018-04-16 RX ADMIN — ENOXAPARIN SODIUM SCH MG: 100 INJECTION SUBCUTANEOUS at 08:26

## 2018-04-16 RX ADMIN — PIPERACILLIN SODIUM AND TAZOBACTAM SODIUM SCH MLS/HR: .375; 3 INJECTION, POWDER, LYOPHILIZED, FOR SOLUTION INTRAVENOUS at 00:17

## 2018-04-16 RX ADMIN — SODIUM CHLORIDE SCH MLS/HR: 9 INJECTION, SOLUTION INTRAVENOUS at 17:32

## 2018-04-16 RX ADMIN — CLINDAMYCIN IN 5 PERCENT DEXTROSE SCH MLS/HR: 12 INJECTION, SOLUTION INTRAVENOUS at 14:31

## 2018-04-16 NOTE — PCM.NOTE
Date and Time: 04/16/18 0712





Subjective Assessment: 





he is ready to go home but otherwise doing well pain controlled the swelling is 

improving less drainage no fever or nausea or vomiting





Objective Exam


General Appearance: no apparent distress, obese


Neurologic Exam: alert, oriented x 3, cooperative


Skin Exam: warm


Respiratory Exam: normal breath sounds


Cardiovascular Exam: regular rate/rhythm, normal heart sounds


Gastrointestinal/Abdomen Exam: soft, normal bowel sounds, No tenderness


Extremity Exam: other (left foot with 1st toe amputation 2nd toe still swollen 

and red but improving with proximal redness and swelling slowly improving)





OBJECTIVE DATA


Vital Signs: 


 Vital Signs - 24 hr











  Temp Pulse Resp BP Pulse Ox


 


 04/16/18 04:00  97.9 F  64  16  131/84  94 L


 


 04/16/18 00:00  98.4 F  72  18  133/80  93 L


 


 04/15/18 20:00  97.9 F  66  18  123/77  95


 


 04/15/18 16:24  97.8 F  72  18  142/68  97


 


 04/15/18 16:00  97.8 F  69  20  146/86  97


 


 04/15/18 12:13  97.8 F  69  20  146/86  97


 


 04/15/18 07:19  97.7 F  66  18  145/90  97








 Pain Assessment - Last Documented











Pain Intensity                 0


 


Pain Scale Used                0-10 Pain Scale











Intake and Output: 


 Intake & Output











 04/13/18 04/14/18 04/15/18 04/16/18





 11:59 11:59 11:59 11:59


 


Intake Total 3850 2431 3428 3633


 


Output Total 4953 668 1102 600


 


Balance 2025 1981 2428 3033


 


Weight 124 kg 124.3 kg  122.4 kg











Lab Results: 


 Lab Results-Last 24 Hours











  04/15/18 Range/Units





  05:35 


 


Segmented Neutrophils  56  (36.-66.)  %


 


Lymphocytes (Manual)  25  (24-44)  %


 


Monocytes (Manual)  9  (0.0-12.0)  %


 


Eosinophils (Manual)  6 H  (0.00-3.0)  %


 


Atypical Lymphocytes  4  %


 


Toxic Granulation  2+  


 


Platelet Estimate  NORMAL  (NORMAL)  


 


RBC Morphology  ABNORMAL  


 


Polychromasia  1+  











Multi-Disciplinary Progress Notes: 


 Multi-Disciplinary Progress Notes





04/15/18 09:59 Case Management Note by Mamie Montano





DISCUSSED DISCHARGE NEEDS WITH MR. PORTILLO.  ADVISED THAT PT WOULD BE WORKING ON 

GETTING A WOUND VAC FOR HIM AND THAT WE WERE AWARE THAT DR. EV MILLER WANTS 

ONE, EXPLICITLY.  DISCUSSED AND EDUCATED ON WOUND VAC USE AND WEIGHT BEARING AS 

WELL AS HIS HELP AT HOME.  PATIENT REFUSES HOME HEALTH CARE, STATES HE LIVES 

WITH SEVERAL PEOPLE, ALL OF WHOM WOULD BE ABLE TO HELP HIM.  HE DOES WANT TO GO 

HOME AND COME BACK IN DAILY FOR DRESSING CHANGES AND IV ANTIBIOTICS.  HE STATES 

HE ONLY LIVES 2 MILES AWAY AND IT WOULD BE EASIER FOR HIM.  HE WANTS TO DO WHAT 

IS BEST, BUT DOES NOT LIKE THE IDEA OF STAYING HERE LONGER IN SWING BED.  

ADVISED THAT PT WOULD INVESTIGATE THE WOUND VAC MORE ON MONDAY, AND THAT D/C 

PLANNING WOULD CONTINUE TO WORK WITH HIM.  HE AGREES WITH THIS AND KNOWS THAT 

IT WOULD BE AT LEAST TUESDAY BEFORE HE WOULD BE READY FOR DISCHARGE, MAYBE 

LONGER.  





Initialized on 04/15/18 09:59 - END OF NOTE

















Assessment/Plan


(1) Necrotizing cellulitis


Current Visit: Yes   Status: Acute   Onset Date: ~04/10/18   


Assessment & Plan: 


with group a strep bacteremia and mrsa osteomyelitis


s/p removal of 1st toe 


surgery plans for wound vac treatment


with the persistent 2nd toe evidence of infection and concern for possible 

osteo here as well would recommend 4 weeks of iv antibiotics


based on cultures would use vancomycin q12h dosing and Ceftriaxone 2g daily via 

PICC


he has been offered ecf vs swing bed and would prefer to go home and return for 

outpatient infusions and wound vac changes


we are working on setting this up. 


Code(s): L03.90 - CELLULITIS, UNSPECIFIED   





(2) Sepsis


Current Visit: Yes   Status: Acute   Onset Date: ~04/10/18   





(3) Bacteremia


Current Visit: Yes   Status: Acute   Onset Date: ~04/10/18   Code(s): R78.81 - 

BACTEREMIA   





(4) Osteomyelitis


Current Visit: Yes   Status: Acute   Onset Date: ~04/11/18   Code(s): M86.9 - 

OSTEOMYELITIS, UNSPECIFIED

## 2018-04-17 RX ADMIN — SODIUM CHLORIDE SCH MLS/HR: 9 INJECTION, SOLUTION INTRAVENOUS at 08:00

## 2018-04-17 RX ADMIN — ENOXAPARIN SODIUM SCH MG: 100 INJECTION SUBCUTANEOUS at 10:20

## 2018-04-17 RX ADMIN — CEFTRIAXONE SCH MLS/HR: 2 INJECTION, SOLUTION INTRAVENOUS at 10:20

## 2018-04-17 RX ADMIN — CLINDAMYCIN IN 5 PERCENT DEXTROSE SCH MLS/HR: 12 INJECTION, SOLUTION INTRAVENOUS at 06:40

## 2018-04-17 RX ADMIN — SODIUM CHLORIDE SCH MLS/HR: 9 INJECTION, SOLUTION INTRAVENOUS at 18:24

## 2018-04-18 VITALS — OXYGEN SATURATION: 96 % | HEART RATE: 63 BPM | SYSTOLIC BLOOD PRESSURE: 162 MMHG | DIASTOLIC BLOOD PRESSURE: 83 MMHG

## 2018-04-18 RX ADMIN — SODIUM CHLORIDE SCH MLS/HR: 9 INJECTION, SOLUTION INTRAVENOUS at 06:26

## 2018-04-18 RX ADMIN — CEFTRIAXONE SCH MLS/HR: 2 INJECTION, SOLUTION INTRAVENOUS at 10:59

## 2018-04-18 RX ADMIN — ENOXAPARIN SODIUM SCH MG: 100 INJECTION SUBCUTANEOUS at 10:59

## 2018-04-18 RX ADMIN — HEPARIN SODIUM (PORCINE) LOCK FLUSH IV SOLN 100 UNIT/ML PRN UNITS: 100 SOLUTION at 17:49

## 2018-04-18 RX ADMIN — SODIUM CHLORIDE SCH MLS/HR: 9 INJECTION, SOLUTION INTRAVENOUS at 15:30

## 2018-04-18 NOTE — PCM.DS
Discharge Summary


Date of Admission: 


04/10/18 08:03





Date of Discharge: 


04/18/18





Admitting Physician: 


RADHA ALANIS





Consults: 





 Consults on Case





04/10/18 15:30


Consult Surgery ROUTINE 











Primary Care Provider: 


NO FAMILY DOCTOR








Allergies


Allergies





No Known Drug Allergies Allergy (Verified 02/28/18 10:10)


 











Hospital Summary





- Hospital Course


Hospital Course: 





Mr. Singletary presented to the ED after having chronic wounds on his feet he 

believes for a long time he had previous partial amputation of the 2nd toe many 

years ago and was on  antibiotics for 3 weeks and does not have a PCP. he 

states the wound on the top of the foot had been getting more swollen and then 

started draining then over the 3 days prior to presentation the foot became 

very swollen with purulent drainage from the ulcer and he developed fever 

chills and nausea and was not able to tolerate po. He finally went to the ED 

where initially he was treated with clindamycin in ED on arrival to floor the 

foot appeared to have significant infection and xray was obtained showing gas 

formation subcutaneous and he was started on vancomycin and zosyn and continued 

on the clinda as well. Peripharl artery ultrasound showed no evidence of 

significant blockages. He had 2/2 blood cultures showing group A strep pyogenes 

that was reported to the [facility] department. His wound culture grew the 

group A strep as well as MRSA. Surgery was consulted based on the xray results 

and he was taken to OR for I and D and it was found to have bone involvement 

with the infection and the 1st toe and partial 1st metatarsal was removed and 

wound left open. After the surgery he was doing much better there continued to 

be copious drainage and the second to remained red and edematous with proximal 

redness as well and some foul smelling drainage. Wound care was helping with 

absorbent dressing changes and consultation was obtained for wound vac. He was 

continued on zosyn and vanc and clinda awaiting the final cultures. When the 

returned he was kept on the vanc and clinda and on 4/16 the zosyn was changed 

to 2g of ceftriaxone in anticipation of outpatient continued antibiotic therapy 

for bactericidal agent vs the group A strep and the vanc to continue for the 

MRSA. The clinda was continued for a total of 7 days IV to help with any 

persistent toxin formation and after 7 days the redness had really improved and 

the swelling was beginning to improve and this was discontinued as well. He was 

fitted with the wound vac and a walking boot and arrangements are made for 

daily infusions bid for an additional 3 weeks at least of iv antibiotics with 

weekly labs. He has follow up with his surgeon Dr. LAMONT Tsai and myself. He had 

good understanding on importance of going for infusions and care of the wound 

vac. He had PICC placed 4/13/18 right upper extremity by Dr. Pradhan





Procedures 


4/11/18: Dr. LAMONT Tsai amputation left 1st toe and partial 1st metatarsal with I 

and D of necrotizing cellulites


4/13/18: PICC insertion right upper extremity Dr. Pradhan catheter length 41cm





- Vitals & Intake/Output


Vital Signs: 





 Vital Signs











Temperature  98.0 F   04/18/18 16:00


 


Pulse Rate  63   04/18/18 16:00


 


Respiratory Rate  18   04/18/18 16:00


 


Blood Pressure  162/83   04/18/18 16:00


 


O2 Sat by Pulse Oximetry  96   04/18/18 16:00











Intake & Output: 





 Intake & Output











 04/16/18 04/17/18 04/18/18 04/19/18





 11:59 11:59 11:59 11:59


 


Intake Total 3633 2746 2525 960


 


Output Total 600  800 


 


Balance 3033 2746 1725 960


 


Weight 122.4 kg 123.1 kg 123.1 kg 














- Lab


Result Diagrams: 


 04/15/18 05:35





 04/15/18 05:35


Micro Results-Entire Visit: 





 Microbiology











 04/11/18 12:30 Wound Culture - Final





 Toe - L Big (Greater)    Methicillin Resist Staph  Aur


 


 04/11/18 12:30 Organism ID and Sensitivity - Final





 Toe - L Big (Greater) 


 


 04/13/18 20:50 Organism ID and Sensitivity - Final





 Blood 


 


 04/13/18 00:45 Organism ID and Sensitivity - Final





 Foot - Left Top 














- Procedures and Test


Procedures and Tests throughout Hospitalization: 





 Therapy Orders & Screens





04/12/18 12:32


PT Eval & Treat (MD Order) 


   Reason for Eval:: wound care and wound vac application


   Diagnosis: DECUBITUS ULCERS ON LEFT FOOT














Discharge Exam


General Appearance: obese


Neurologic Exam: alert, oriented x 3, cooperative


Skin Exam: warm, dry, other (back scar X 2 one from melanoma removal the other 

from skin graft harvest for that area)


Ears, Nose, Throat Exam: moist mucous membranes


Neck Exam: normal inspection, non-tender, supple


Respiratory Exam: normal breath sounds, lungs clear


Cardiovascular Exam: regular rate/rhythm, normal heart sounds, normal 

peripheral pulses


Gastrointestinal/Abdomen Exam: normal bowel sounds, No tenderness, No distention


Extremity Exam: other (left foot with open wound in v distribution with viable 

tissue minimal drainage mostly bloody currently with the surrounding redness 

much improved but persistent edema in the foot and lower leg good peripheral 

perfusion in the 2nd toe 2 sole ulcerations with crusted base.), No calf 

tenderness





Final Diagnosis/Problem List





- Final Discharge Diagnosis/Problem


(1) Necrotizing cellulitis


Status: Acute   Onset Date: ~04/10/18   





(2) Sepsis


Status: Resolved   Onset Date: ~04/10/18   





(3) Bacteremia


Status: Resolved   Onset Date: ~04/10/18   





(4) Osteomyelitis


Status: Acute   Onset Date: ~04/11/18   





- Discharge


Discharge Date: 04/18/18


Disposition: Home, Self-Care


Condition: Stable


Prescriptions: 


New


   Ceftriaxone in Is-Osm Dextrose [Ceftriaxone 2 gm Piggyback] 2 gm IV DAILY #

21 froz.piggy


   Vancomycin HCl in Dextrose 5 % [Vancomycin 1.5 Gram/250 ml-D5w] 1.5 gm IV 

Q12H #42 plast..bag


   Aspirin EC 81 mg*** [Ecotrin 81 mg***] 81 mg PO DAILY #30 tablet


Instructions:  Osteomyelitis (DC), Sepsis, Adult (DC)


Additional Instructions: 


weekly vanc trough, esr, cbc, bmp


Wound vac changes / maintenance per PT wound care





YOU WILL NEED TO COME TO HOSPITAL TWICE DAILY FOR YOUR IV ANTIBIOTICS.  MORNING 

DOSE AT 6:00 A.M. AND EVENING DOSE AT 5:00 P.M. UNLESS OTHERWISE DIRECTED.


Follow up with: 


RADHA ALANIS [ACTIVE STAFF] - 05/03/18 1:00 pm


TOMASZ TSAI [ACTIVE STAFF] - 04/24/18 9:30 am ([facility])

## 2018-04-18 NOTE — PCM.DCORD
- Discharge


Discharge Date: 04/18/18


Disposition: Home, Self-Care


Condition: Stable


Prescriptions: 


New


   Ceftriaxone in Is-Osm Dextrose [Ceftriaxone 2 gm Piggyback] 2 gm IV DAILY #

21 froz.piggy


   Vancomycin HCl in Dextrose 5 % [Vancomycin 1.5 Gram/250 ml-D5w] 1.5 gm IV 

Q12H #42 plast..bag


   Aspirin EC 81 mg*** [Ecotrin 81 mg***] 81 mg PO DAILY #30 tablet


Additional Instructions: 


weekly vanc trough, esr, cbc, bmp


Wound vac changes / maintenance per PT wound care


Follow up with: 


EV MILLER MD [ASSOCIATE STAFF] - 1 Week

## 2021-04-23 NOTE — XRAY
Indication: Long-term IV access and therapy for left foot

infection/osteomyelitis.



Informed consent obtained.  Patient was placed on the fluoroscopic table in a

supine position.  Initial sonographic imaging of the right upper extremity was

performed for localization of patent veins.  The right upper extremity was

then prepped and draped in sterile fashion.  Tourniquet applied.  1% lidocaine

plain used for local anesthesia. Using ultrasound guidance and a micropuncture

needle, a basilic vein above the elbow was successfully percutaneously

cannulized.  A floppy tip 0.018 guidewire inserted.  Tourniquet released.

Needle was exchanged for a 5 [place] citizen dilator peel-away sheath catheter.

Ultimately a 5 [place] citizen double-lumen PICC line was inserted over a longer 0.018

guidewire with the tip positioned in the distal SVC using fluoroscopic

guidance.  Guidewire removed.  Both ports flushed with heparinized saline.

Catheter was secured.  Postoperative instructions and orders given.  Patient

discharged in good condition.



Impression: Technically successful right upper extremity PICC line placement

using ultrasound and fluoroscopic guidance.  No immediate complications.

Approximately 1 cc blood loss.  Approximately 0.4 minute of fluoroscopy used.

Catheter length is 41 cm. Letter faxed to requested address. Confirmation received. Spoke with mom to inform letter faxed.

## 2021-07-15 NOTE — HP
DATE OF SURGERY:   07/22/2021 



HISTORY OF PRESENT ILLNESS:  The patient is a 70 year-old male presented with a 2 x 2 cm 
raised, red and irregular skin lesion of the right proximal ear. He had this for some 
time. It appears it is growing. He wishes for surgical intervention. The patient does have 
a history of basal cell and melanoma. 



PAST MEDICAL HISTORY:  Neuropathy. Osteomyelitis with left foot amputation. Melanoma. 
Basal cell. 



PAST SURGICAL HISTORY: Skin lesion removal. Left foot amputation. 



ALLERGIES:  NKDA.



MEDICATIONS:  Gabapentin.  



FAMILY HISTORY: Breast cancer. Colon cancer. 



SOCIAL HISTORY:  None.



REVIEW OF SYSTEMS: CONSTITUTIONAL:  Denies fever or chills. CHEST: Denies shortness of 
breath. CVS: Denies chest pain. ABDOMEN: Denies abdominal pain. INTEGUMENTARY: Right ear 
lesion. 



PHYSICAL EXAMINATION:  

GENERAL:  No acute distress. 

CHEST: Nonlabored. No shortness of breath. 

CVS:  Regular rate and rhythm.

ABDOMEN: Soft. 

EXTREMITIES: No edema. 

NEUROLOGIC: Alert.

PSYCHIATRIC: Appropriate. 



IMPRESSION:  Right proximal auricular lesion, atypical.  



PLAN:  Excision of the right proximal auricular lesion with split thickness skin graft 
with Dr. Max Tsai. The patient was offered colonoscopy at the time of the visit due to 
family history and he declined. 



As dictated by Eliza Larsen NP.

## 2021-07-22 ENCOUNTER — HOSPITAL ENCOUNTER (OUTPATIENT)
Dept: HOSPITAL 33 - SDC | Age: 71
Discharge: HOME | End: 2021-07-22
Attending: SURGERY
Payer: MEDICARE

## 2021-07-22 VITALS — DIASTOLIC BLOOD PRESSURE: 68 MMHG | HEART RATE: 64 BPM | SYSTOLIC BLOOD PRESSURE: 114 MMHG

## 2021-07-22 VITALS — OXYGEN SATURATION: 96 %

## 2021-07-22 DIAGNOSIS — C44.212: Primary | ICD-10-CM

## 2021-07-22 DIAGNOSIS — Z85.828: ICD-10-CM

## 2021-07-22 DIAGNOSIS — Z79.899: ICD-10-CM

## 2021-07-22 PROCEDURE — 88305 TISSUE EXAM BY PATHOLOGIST: CPT

## 2021-07-22 NOTE — OP
SURGERY DATE/TIME:  07/22/2021  1149



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



PROCEDURES:    

1) Excision of 2.5 cm skin cancer full thickness. 

2) [place] of 4 sq/cm with split thickness skin graft. 3) Application of 4 sq/cm split 
thickness skin graft. 



SURGEON:        Max Tsai M.D.



ANESTHESIA:    General. 



COMPLICATIONS:    None.



CONDITION:        Stable.



INDICATION:  The patient has multiple skin cancers.     



DESCRIPTION OF PROCEDURE:  He was taken to surgery. General anesthetic. Routine prep and 
drape. Edges were marked 1 mm margin, 2 mm and wider areas. Hemostasis obtained with 
electrocautery. It was delivered. Edges were all grossly clear. Hemostasis obtained with 
electrocautery. Hemostasis satisfactory. Deep margins were all grossly clear. Skin 
harvested from the thigh 1/14,000 of an inch was applied with sutures 4-0 chromic. Sterile 
ointment applied. Sterile dressings to the leg. The patient tolerated the procedure 
satisfactorily.

## 2022-11-28 NOTE — PCM.NOTE
Date and Time: 04/17/18  1409





Subjective Assessment: 





doing well tolerated dressing change well yesterday


waiting on the wound vac measurments taken yesterday


has remained afebrile


wound was derided yesterday tolerating antibiotics well. 





Objective Exam


General Appearance: no apparent distress (left foot dressing clean and dry 

currently after chane with calcium alginate yesterday no proximal warmth or 

swelling), alert


Neurologic Exam: alert, oriented x 3, cooperative, normal mood/affect, nml 

cerebellar function, sensation nml, No motor deficits


Skin Exam: normal color, warm, dry


Eye Exam: PERRL, EOMI, eyes nml inspection


Ears, Nose, Throat Exam: normal ENT inspection, pharynx normal, moist mucous 

membranes


Neck Exam: normal inspection, non-tender, supple, full range of motion


Respiratory Exam: normal breath sounds, lungs clear, No respiratory distress


Cardiovascular Exam: regular rate/rhythm, normal heart sounds


Gastrointestinal/Abdomen Exam: soft, No tenderness, No mass


Extremity Exam: normal inspection, normal range of motion


Back Exam: normal inspection, normal range of motion, No CVA tenderness, No 

vertebral tenderness


Male Genitalia Exam: deferred


Rectal Exam: deferred





OBJECTIVE DATA


Vital Signs: 


 Vital Signs - 24 hr











  Temp Pulse Resp BP Pulse Ox


 


 04/17/18 11:55  98.4 F  76  18  127/78  95


 


 04/17/18 07:34  98.5 F  86  18  132/75  96


 


 04/17/18 04:00  98.2 F  75  20  125/73  94 L


 


 04/17/18 00:00  98.1 F  66  18  123/74  96


 


 04/16/18 20:00  97.5 F  69  18  154/92  97


 


 04/16/18 16:00  97.9 F  78  18  122/71  97








 Pain Assessment - Last Documented











Pain Intensity                 0


 


Pain Scale Used                0-10 Pain Scale











Intake and Output: 


 Intake & Output











 04/15/18 04/16/18 04/17/18 04/18/18





 11:59 11:59 11:59 11:59


 


Intake Total 3428 3633 2746 


 


Output Total 1000 600  


 


Balance 2428 3033 2746 


 


Weight  122.4 kg 123.1 kg 











Multi-Disciplinary Progress Notes: 


 Multi-Disciplinary Progress Notes





04/16/18 15:34 Physical Therapy Note by Molly Santoyo


WOUND RE-ASSESSED THIS A.M.WITH THE WOUND VAC REP PRESENT. SHE WAS ABLE TO 

OBSERVE THE POST-OP OPEN WOUND AS MEASUREMENTS WERE TAKEN. WOUND SIZE IS 7.2 X 

3.5 X 3.5 CM.  THE [facility] WOUND NURSE IS EXPECTED HERE WEDNESDAY TO APPLY THE 

WOUND VAC PRIOR TO PATIENT D/C HOME. PATIENT PCP WILL FINALIZE WITH PHARMACY 

THE OUTPT INFUSION ANTIBIOTIC TX THAT WILL CONTINUE AFTER D/C. WOUND VAC WILL 

BE MONITORED IN THE INFUSION CENTER BY NURSE AND P.T. 


TODAY WOUND WAS CLEANSED WITH HIBICLENS/STERILE WATER SOAK; SHARP DEBRIDEMENT 

WITH SCALPEL OF CALLOUS AREAS PERIWOUND; SCISSORS AND FORCEP DEBRIDEMENT OF 

PRIMARY WOUND EDGES AND WOUND BED AS INDICATED. WOUND WAS DRESSED WITH CALCIUM 

ALGINATE IN WOUND BED TO PROMOTE AUTOLYTIC DEBRIDEMENT IN PREP FOR VAC. BARRIER 

OINTMENT TOPICALLY APPLIED PERIWOUND FOR CALLOUS AND SKIN HYDRATION AND 

CONDITIONING TO FACILITATE SLOUGHING OF NON-VIABLE EPIDERMIS. SECONDARY 

DRESSING WITH 4X4 LAYERED GAUZE AND CARSON WRAP ANCHOR. DRESSING SECURED WITH 

TUBIGRIP SLEEVE FOR MILD COMPRESSION AS WELL. HOPEFUL TO MAINTAIN INTACT 

DRESSING FOR 1-2 DAYS.





Initialized on 04/16/18 15:34 - END OF NOTE

















Assessment/Plan


(1) Necrotizing cellulitis


Current Visit: Yes   Status: Acute   Onset Date: ~04/10/18   


Assessment & Plan: 


with osteomyelitis and amputation of the 1st toe and part of first metatarsal 

with open wound improving concern for possible involvment of the 2nd toe as 

well with osteo will plan on 4 weeks antibiotic therapy for the wound culture + 

MRSA will continue the vanc with bid doseing and with the strep pyogenes 

bacteremia and gas gangrene he has completed 7 days of iv clindamycin for the 

antitoxigenic effects and was on zosyn that is changed to ceftriaxone and will 

complete 2g daily for additional 3 weeks at discharge


Code(s): L03.90 - CELLULITIS, UNSPECIFIED   





(2) Sepsis


Current Visit: Yes   Status: Resolved   Onset Date: ~04/10/18   





(3) Bacteremia


Current Visit: Yes   Status: Resolved   Onset Date: ~04/10/18   Code(s): R78.81 

- BACTEREMIA   





(4) Osteomyelitis


Current Visit: Yes   Status: Acute   Onset Date: ~04/11/18   Code(s): M86.9 - 

OSTEOMYELITIS, UNSPECIFIED Tremfya Pregnancy And Lactation Text: The risk during pregnancy and breastfeeding is uncertain with this medication.